# Patient Record
Sex: MALE | Race: BLACK OR AFRICAN AMERICAN | NOT HISPANIC OR LATINO | Employment: FULL TIME | URBAN - METROPOLITAN AREA
[De-identification: names, ages, dates, MRNs, and addresses within clinical notes are randomized per-mention and may not be internally consistent; named-entity substitution may affect disease eponyms.]

---

## 2020-02-20 ENCOUNTER — HOSPITAL ENCOUNTER (OUTPATIENT)
Facility: HOSPITAL | Age: 25
Discharge: HOME | End: 2020-02-21
Attending: EMERGENCY MEDICINE | Admitting: INTERNAL MEDICINE
Payer: COMMERCIAL

## 2020-02-20 DIAGNOSIS — S62.304A CLOSED NONDISPLACED FRACTURE OF FOURTH METACARPAL BONE OF RIGHT HAND, UNSPECIFIED PORTION OF METACARPAL, INITIAL ENCOUNTER: ICD-10-CM

## 2020-02-20 DIAGNOSIS — S62.90XB: ICD-10-CM

## 2020-02-20 DIAGNOSIS — M79.641 RIGHT HAND PAIN: Primary | ICD-10-CM

## 2020-02-20 LAB
ANION GAP SERPL CALC-SCNC: 10 MMOL/L (ref 8–16)
BASOPHILS # BLD AUTO: 0.04 K/UL (ref 0–0.2)
BASOPHILS NFR BLD: 0.5 % (ref 0–1.9)
BUN SERPL-MCNC: 13 MG/DL (ref 6–20)
CALCIUM SERPL-MCNC: 9.9 MG/DL (ref 8.7–10.5)
CHLORIDE SERPL-SCNC: 106 MMOL/L (ref 95–110)
CO2 SERPL-SCNC: 24 MMOL/L (ref 23–29)
CREAT SERPL-MCNC: 1.2 MG/DL (ref 0.5–1.4)
DIFFERENTIAL METHOD: ABNORMAL
EOSINOPHIL # BLD AUTO: 0.1 K/UL (ref 0–0.5)
EOSINOPHIL NFR BLD: 1.3 % (ref 0–8)
ERYTHROCYTE [DISTWIDTH] IN BLOOD BY AUTOMATED COUNT: 13.1 % (ref 11.5–14.5)
EST. GFR  (AFRICAN AMERICAN): >60 ML/MIN/1.73 M^2
EST. GFR  (NON AFRICAN AMERICAN): >60 ML/MIN/1.73 M^2
GLUCOSE SERPL-MCNC: 88 MG/DL (ref 70–110)
HCT VFR BLD AUTO: 43.1 % (ref 40–54)
HGB BLD-MCNC: 13.6 G/DL (ref 14–18)
IMM GRANULOCYTES # BLD AUTO: 0.02 K/UL (ref 0–0.04)
IMM GRANULOCYTES NFR BLD AUTO: 0.3 % (ref 0–0.5)
LYMPHOCYTES # BLD AUTO: 1.3 K/UL (ref 1–4.8)
LYMPHOCYTES NFR BLD: 16.2 % (ref 18–48)
MCH RBC QN AUTO: 26.8 PG (ref 27–31)
MCHC RBC AUTO-ENTMCNC: 31.6 G/DL (ref 32–36)
MCV RBC AUTO: 85 FL (ref 82–98)
MONOCYTES # BLD AUTO: 0.5 K/UL (ref 0.3–1)
MONOCYTES NFR BLD: 5.8 % (ref 4–15)
NEUTROPHILS # BLD AUTO: 5.9 K/UL (ref 1.8–7.7)
NEUTROPHILS NFR BLD: 75.9 % (ref 38–73)
NRBC BLD-RTO: 0 /100 WBC
PLATELET # BLD AUTO: 195 K/UL (ref 150–350)
PMV BLD AUTO: 10.3 FL (ref 9.2–12.9)
POTASSIUM SERPL-SCNC: 4.2 MMOL/L (ref 3.5–5.1)
RBC # BLD AUTO: 5.08 M/UL (ref 4.6–6.2)
SODIUM SERPL-SCNC: 140 MMOL/L (ref 136–145)
WBC # BLD AUTO: 7.79 K/UL (ref 3.9–12.7)

## 2020-02-20 PROCEDURE — 96365 THER/PROPH/DIAG IV INF INIT: CPT | Mod: 59

## 2020-02-20 PROCEDURE — 63600175 PHARM REV CODE 636 W HCPCS: Performed by: NURSE PRACTITIONER

## 2020-02-20 PROCEDURE — G0378 HOSPITAL OBSERVATION PER HR: HCPCS

## 2020-02-20 PROCEDURE — 90715 TDAP VACCINE 7 YRS/> IM: CPT | Performed by: NURSE PRACTITIONER

## 2020-02-20 PROCEDURE — 29125 APPL SHORT ARM SPLINT STATIC: CPT | Mod: RT

## 2020-02-20 PROCEDURE — 80048 BASIC METABOLIC PNL TOTAL CA: CPT

## 2020-02-20 PROCEDURE — 99285 EMERGENCY DEPT VISIT HI MDM: CPT | Mod: 25

## 2020-02-20 PROCEDURE — 85025 COMPLETE CBC W/AUTO DIFF WBC: CPT

## 2020-02-20 PROCEDURE — 36415 COLL VENOUS BLD VENIPUNCTURE: CPT

## 2020-02-20 PROCEDURE — 25000003 PHARM REV CODE 250: Performed by: NURSE PRACTITIONER

## 2020-02-20 PROCEDURE — 96375 TX/PRO/DX INJ NEW DRUG ADDON: CPT | Mod: 59

## 2020-02-20 PROCEDURE — 90471 IMMUNIZATION ADMIN: CPT | Performed by: NURSE PRACTITIONER

## 2020-02-20 RX ORDER — OXYCODONE AND ACETAMINOPHEN 5; 325 MG/1; MG/1
1 TABLET ORAL EVERY 4 HOURS PRN
Status: DISCONTINUED | OUTPATIENT
Start: 2020-02-20 | End: 2020-02-21 | Stop reason: HOSPADM

## 2020-02-20 RX ORDER — ONDANSETRON 4 MG/1
4 TABLET, ORALLY DISINTEGRATING ORAL
Status: COMPLETED | OUTPATIENT
Start: 2020-02-20 | End: 2020-02-20

## 2020-02-20 RX ORDER — MORPHINE SULFATE 4 MG/ML
4 INJECTION, SOLUTION INTRAMUSCULAR; INTRAVENOUS
Status: COMPLETED | OUTPATIENT
Start: 2020-02-20 | End: 2020-02-20

## 2020-02-20 RX ORDER — OXYCODONE AND ACETAMINOPHEN 10; 325 MG/1; MG/1
1 TABLET ORAL EVERY 4 HOURS PRN
Status: DISCONTINUED | OUTPATIENT
Start: 2020-02-20 | End: 2020-02-21 | Stop reason: HOSPADM

## 2020-02-20 RX ORDER — ACETAMINOPHEN 325 MG/1
650 TABLET ORAL EVERY 6 HOURS PRN
Status: DISCONTINUED | OUTPATIENT
Start: 2020-02-20 | End: 2020-02-21 | Stop reason: HOSPADM

## 2020-02-20 RX ORDER — MAG HYDROX/ALUMINUM HYD/SIMETH 200-200-20
30 SUSPENSION, ORAL (FINAL DOSE FORM) ORAL EVERY 6 HOURS PRN
Status: DISCONTINUED | OUTPATIENT
Start: 2020-02-20 | End: 2020-02-21 | Stop reason: HOSPADM

## 2020-02-20 RX ORDER — CEFAZOLIN SODIUM 1 G/50ML
1 SOLUTION INTRAVENOUS
Status: COMPLETED | OUTPATIENT
Start: 2020-02-20 | End: 2020-02-20

## 2020-02-20 RX ORDER — ONDANSETRON 2 MG/ML
4 INJECTION INTRAMUSCULAR; INTRAVENOUS EVERY 8 HOURS PRN
Status: DISCONTINUED | OUTPATIENT
Start: 2020-02-20 | End: 2020-02-21 | Stop reason: HOSPADM

## 2020-02-20 RX ADMIN — ONDANSETRON 4 MG: 4 TABLET, ORALLY DISINTEGRATING ORAL at 11:02

## 2020-02-20 RX ADMIN — CLOSTRIDIUM TETANI TOXOID ANTIGEN (FORMALDEHYDE INACTIVATED), CORYNEBACTERIUM DIPHTHERIAE TOXOID ANTIGEN (FORMALDEHYDE INACTIVATED), BORDETELLA PERTUSSIS TOXOID ANTIGEN (GLUTARALDEHYDE INACTIVATED), BORDETELLA PERTUSSIS FILAMENTOUS HEMAGGLUTININ ANTIGEN (FORMALDEHYDE INACTIVATED), BORDETELLA PERTUSSIS PERTACTIN ANTIGEN, AND BORDETELLA PERTUSSIS FIMBRIAE 2/3 ANTIGEN 0.5 ML: 5; 2; 2.5; 5; 3; 5 INJECTION, SUSPENSION INTRAMUSCULAR at 09:02

## 2020-02-20 RX ADMIN — CEFAZOLIN SODIUM 1 G: 1 SOLUTION INTRAVENOUS at 11:02

## 2020-02-20 RX ADMIN — MORPHINE SULFATE 4 MG: 4 INJECTION, SOLUTION INTRAMUSCULAR; INTRAVENOUS at 11:02

## 2020-02-21 ENCOUNTER — ANESTHESIA (OUTPATIENT)
Dept: SURGERY | Facility: HOSPITAL | Age: 25
End: 2020-02-21
Payer: COMMERCIAL

## 2020-02-21 ENCOUNTER — ANESTHESIA EVENT (OUTPATIENT)
Dept: SURGERY | Facility: HOSPITAL | Age: 25
End: 2020-02-21
Payer: COMMERCIAL

## 2020-02-21 VITALS
DIASTOLIC BLOOD PRESSURE: 57 MMHG | TEMPERATURE: 98 F | SYSTOLIC BLOOD PRESSURE: 128 MMHG | OXYGEN SATURATION: 98 % | BODY MASS INDEX: 19.92 KG/M2 | WEIGHT: 147.06 LBS | HEART RATE: 56 BPM | HEIGHT: 72 IN | RESPIRATION RATE: 16 BRPM

## 2020-02-21 PROBLEM — M79.641 RIGHT HAND PAIN: Status: ACTIVE | Noted: 2020-02-21

## 2020-02-21 PROBLEM — S62.304A CLOSED NONDISPLACED FRACTURE OF FOURTH METACARPAL BONE OF RIGHT HAND: Status: ACTIVE | Noted: 2020-02-21

## 2020-02-21 PROCEDURE — G0378 HOSPITAL OBSERVATION PER HR: HCPCS

## 2020-02-21 PROCEDURE — 36000709 HC OR TIME LEV III EA ADD 15 MIN: Performed by: ORTHOPAEDIC SURGERY

## 2020-02-21 PROCEDURE — 36000708 HC OR TIME LEV III 1ST 15 MIN: Performed by: ORTHOPAEDIC SURGERY

## 2020-02-21 PROCEDURE — 63600175 PHARM REV CODE 636 W HCPCS: Performed by: NURSE ANESTHETIST, CERTIFIED REGISTERED

## 2020-02-21 PROCEDURE — C1713 ANCHOR/SCREW BN/BN,TIS/BN: HCPCS | Performed by: ORTHOPAEDIC SURGERY

## 2020-02-21 PROCEDURE — 37000008 HC ANESTHESIA 1ST 15 MINUTES: Performed by: ORTHOPAEDIC SURGERY

## 2020-02-21 PROCEDURE — 37000009 HC ANESTHESIA EA ADD 15 MINS: Performed by: ORTHOPAEDIC SURGERY

## 2020-02-21 PROCEDURE — 71000033 HC RECOVERY, INTIAL HOUR: Performed by: ORTHOPAEDIC SURGERY

## 2020-02-21 PROCEDURE — 25000003 PHARM REV CODE 250: Performed by: INTERNAL MEDICINE

## 2020-02-21 PROCEDURE — 25000003 PHARM REV CODE 250: Performed by: NURSE ANESTHETIST, CERTIFIED REGISTERED

## 2020-02-21 PROCEDURE — 27201423 OPTIME MED/SURG SUP & DEVICES STERILE SUPPLY: Performed by: ORTHOPAEDIC SURGERY

## 2020-02-21 PROCEDURE — 64415 NJX AA&/STRD BRCH PLXS IMG: CPT | Performed by: NURSE ANESTHETIST, CERTIFIED REGISTERED

## 2020-02-21 DEVICE — IMPLANTABLE DEVICE: Type: IMPLANTABLE DEVICE | Site: HAND | Status: FUNCTIONAL

## 2020-02-21 RX ORDER — KETOROLAC TROMETHAMINE 30 MG/ML
INJECTION, SOLUTION INTRAMUSCULAR; INTRAVENOUS
Status: DISCONTINUED | OUTPATIENT
Start: 2020-02-21 | End: 2020-02-21

## 2020-02-21 RX ORDER — ONDANSETRON 2 MG/ML
4 INJECTION INTRAMUSCULAR; INTRAVENOUS DAILY PRN
Status: DISCONTINUED | OUTPATIENT
Start: 2020-02-21 | End: 2020-02-21 | Stop reason: HOSPADM

## 2020-02-21 RX ORDER — SODIUM CHLORIDE, SODIUM LACTATE, POTASSIUM CHLORIDE, CALCIUM CHLORIDE 600; 310; 30; 20 MG/100ML; MG/100ML; MG/100ML; MG/100ML
INJECTION, SOLUTION INTRAVENOUS CONTINUOUS PRN
Status: DISCONTINUED | OUTPATIENT
Start: 2020-02-21 | End: 2020-02-21

## 2020-02-21 RX ORDER — SODIUM CHLORIDE 0.9 % (FLUSH) 0.9 %
10 SYRINGE (ML) INJECTION
Status: DISCONTINUED | OUTPATIENT
Start: 2020-02-21 | End: 2020-02-21 | Stop reason: HOSPADM

## 2020-02-21 RX ORDER — ROPIVACAINE HYDROCHLORIDE 5 MG/ML
INJECTION, SOLUTION EPIDURAL; INFILTRATION; PERINEURAL
Status: COMPLETED | OUTPATIENT
Start: 2020-02-21 | End: 2020-02-21

## 2020-02-21 RX ORDER — MIDAZOLAM HYDROCHLORIDE 1 MG/ML
INJECTION, SOLUTION INTRAMUSCULAR; INTRAVENOUS
Status: DISCONTINUED | OUTPATIENT
Start: 2020-02-21 | End: 2020-02-21

## 2020-02-21 RX ORDER — SUCCINYLCHOLINE CHLORIDE 20 MG/ML
INJECTION INTRAMUSCULAR; INTRAVENOUS
Status: DISCONTINUED | OUTPATIENT
Start: 2020-02-21 | End: 2020-02-21

## 2020-02-21 RX ORDER — CEFAZOLIN SODIUM 1 G/3ML
INJECTION, POWDER, FOR SOLUTION INTRAMUSCULAR; INTRAVENOUS
Status: DISCONTINUED | OUTPATIENT
Start: 2020-02-21 | End: 2020-02-21

## 2020-02-21 RX ORDER — LIDOCAINE HYDROCHLORIDE 20 MG/ML
INJECTION, SOLUTION EPIDURAL; INFILTRATION; INTRACAUDAL; PERINEURAL
Status: DISCONTINUED | OUTPATIENT
Start: 2020-02-21 | End: 2020-02-21

## 2020-02-21 RX ORDER — FENTANYL CITRATE 50 UG/ML
25 INJECTION, SOLUTION INTRAMUSCULAR; INTRAVENOUS EVERY 5 MIN PRN
Status: DISCONTINUED | OUTPATIENT
Start: 2020-02-21 | End: 2020-02-21 | Stop reason: HOSPADM

## 2020-02-21 RX ORDER — CEFAZOLIN SODIUM 2 G/50ML
2 SOLUTION INTRAVENOUS
Status: DISCONTINUED | OUTPATIENT
Start: 2020-02-21 | End: 2020-02-21 | Stop reason: HOSPADM

## 2020-02-21 RX ORDER — HYDROCODONE BITARTRATE AND ACETAMINOPHEN 7.5; 325 MG/15ML; MG/15ML
15 SOLUTION ORAL EVERY 4 HOURS PRN
Qty: 630 ML | Refills: 0 | Status: SHIPPED | OUTPATIENT
Start: 2020-02-21 | End: 2020-12-26 | Stop reason: CLARIF

## 2020-02-21 RX ORDER — PROPOFOL 10 MG/ML
VIAL (ML) INTRAVENOUS
Status: DISCONTINUED | OUTPATIENT
Start: 2020-02-21 | End: 2020-02-21

## 2020-02-21 RX ORDER — ONDANSETRON 2 MG/ML
INJECTION INTRAMUSCULAR; INTRAVENOUS
Status: DISCONTINUED | OUTPATIENT
Start: 2020-02-21 | End: 2020-02-21

## 2020-02-21 RX ORDER — GLYCOPYRROLATE 0.2 MG/ML
INJECTION INTRAMUSCULAR; INTRAVENOUS
Status: DISCONTINUED | OUTPATIENT
Start: 2020-02-21 | End: 2020-02-21

## 2020-02-21 RX ORDER — CHLORHEXIDINE GLUCONATE ORAL RINSE 1.2 MG/ML
10 SOLUTION DENTAL
Status: DISCONTINUED | OUTPATIENT
Start: 2020-02-21 | End: 2020-02-21 | Stop reason: HOSPADM

## 2020-02-21 RX ORDER — BUPIVACAINE HYDROCHLORIDE 2.5 MG/ML
INJECTION, SOLUTION EPIDURAL; INFILTRATION; INTRACAUDAL
Status: DISCONTINUED | OUTPATIENT
Start: 2020-02-21 | End: 2020-02-21 | Stop reason: HOSPADM

## 2020-02-21 RX ORDER — DEXAMETHASONE SODIUM PHOSPHATE 4 MG/ML
INJECTION, SOLUTION INTRA-ARTICULAR; INTRALESIONAL; INTRAMUSCULAR; INTRAVENOUS; SOFT TISSUE
Status: DISCONTINUED | OUTPATIENT
Start: 2020-02-21 | End: 2020-02-21

## 2020-02-21 RX ADMIN — KETOROLAC TROMETHAMINE 30 MG: 30 INJECTION, SOLUTION INTRAMUSCULAR; INTRAVENOUS at 10:02

## 2020-02-21 RX ADMIN — PROPOFOL 50 MG: 10 INJECTION, EMULSION INTRAVENOUS at 11:02

## 2020-02-21 RX ADMIN — PROPOFOL 10 MG: 10 INJECTION, EMULSION INTRAVENOUS at 10:02

## 2020-02-21 RX ADMIN — ONDANSETRON 4 MG: 2 INJECTION, SOLUTION INTRAMUSCULAR; INTRAVENOUS at 08:02

## 2020-02-21 RX ADMIN — SUCCINYLCHOLINE CHLORIDE 10 MG: 20 INJECTION, SOLUTION INTRAMUSCULAR; INTRAVENOUS at 11:02

## 2020-02-21 RX ADMIN — SODIUM CHLORIDE, SODIUM LACTATE, POTASSIUM CHLORIDE, AND CALCIUM CHLORIDE: 600; 310; 30; 20 INJECTION, SOLUTION INTRAVENOUS at 08:02

## 2020-02-21 RX ADMIN — PROPOFOL 50 MG: 10 INJECTION, EMULSION INTRAVENOUS at 08:02

## 2020-02-21 RX ADMIN — OXYCODONE HYDROCHLORIDE AND ACETAMINOPHEN 1 TABLET: 10; 325 TABLET ORAL at 03:02

## 2020-02-21 RX ADMIN — ROPIVACAINE HYDROCHLORIDE 20 ML: 5 INJECTION, SOLUTION EPIDURAL; INFILTRATION; PERINEURAL at 08:02

## 2020-02-21 RX ADMIN — LIDOCAINE HYDROCHLORIDE 80 MG: 20 INJECTION, SOLUTION EPIDURAL; INFILTRATION; INTRACAUDAL; PERINEURAL at 08:02

## 2020-02-21 RX ADMIN — PROPOFOL 20 MG: 10 INJECTION, EMULSION INTRAVENOUS at 11:02

## 2020-02-21 RX ADMIN — MIDAZOLAM 2 MG: 1 INJECTION INTRAMUSCULAR; INTRAVENOUS at 08:02

## 2020-02-21 RX ADMIN — PROPOFOL 100 MG: 10 INJECTION, EMULSION INTRAVENOUS at 08:02

## 2020-02-21 RX ADMIN — DEXAMETHASONE SODIUM PHOSPHATE 4 MG: 4 INJECTION, SOLUTION INTRA-ARTICULAR; INTRALESIONAL; INTRAMUSCULAR; INTRAVENOUS; SOFT TISSUE at 08:02

## 2020-02-21 RX ADMIN — ROBINUL 0.2 MG: 0.2 INJECTION INTRAMUSCULAR; INTRAVENOUS at 08:02

## 2020-02-21 RX ADMIN — PROPOFOL 20 MG: 10 INJECTION, EMULSION INTRAVENOUS at 10:02

## 2020-02-21 RX ADMIN — CEFAZOLIN 2 G: 1 INJECTION, POWDER, FOR SOLUTION INTRAMUSCULAR; INTRAVENOUS at 08:02

## 2020-02-21 NOTE — ED NOTES
Requested that pts meds be changed to liquid, as pt states he is unable to swallow pills. Hospital medicine states, continue to crush pills.

## 2020-02-21 NOTE — ANESTHESIA PREPROCEDURE EVALUATION
02/21/2020  Oswaldo Prakash Jr. is a 24 y.o., male.    Anesthesia Evaluation    I have reviewed the Patient Summary Reports.    I have reviewed the Nursing Notes.   I have reviewed the Medications.     Review of Systems  Anesthesia Hx:  No problems with previous Anesthesia Denies Hx of Anesthetic complications  Neg history of prior surgery. Denies Family Hx of Anesthesia complications.   Denies Personal Hx of Anesthesia complications.   Social:  Non-Smoker, No Alcohol Use    Cardiovascular:  Cardiovascular Normal  ECG has been reviewed.    Pulmonary:  Pulmonary Normal    Renal/:  Renal/ Normal     Hepatic/GI:  Hepatic/GI Normal    Neurological:  Neurology Normal    Endocrine:  Endocrine Normal    Psych:  Psychiatric Normal         Patient Active Problem List   Diagnosis    Open hand fracture    Closed nondisplaced fracture of fourth and fifth metacarpal bone of right hand         Physical Exam  General:  Well nourished    Airway/Jaw/Neck:  Airway Findings: Mouth Opening: Normal Tongue: Normal  General Airway Assessment: Adult  Mallampati: II  TM Distance: 4 - 6 cm  Jaw/Neck Findings:  Neck ROM: Normal ROM      Dental:  Dental Findings: In tact, Prominent Incisors   Chest/Lungs:  Chest/Lungs Findings: Clear to auscultation, Normal Respiratory Rate     Heart/Vascular:  Heart Findings: Rate: Normal  Rhythm: Regular Rhythm  Sounds: Normal        Mental Status:  Mental Status Findings:  Cooperative, Alert and Oriented       Lab Results   Component Value Date    WBC 7.79 02/20/2020    HGB 13.6 (L) 02/20/2020    HCT 43.1 02/20/2020    MCV 85 02/20/2020     02/20/2020         Chemistry        Component Value Date/Time     02/20/2020 2315    K 4.2 02/20/2020 2315     02/20/2020 2315    CO2 24 02/20/2020 2315    BUN 13 02/20/2020 2315    CREATININE 1.2 02/20/2020 2315    GLU 88 02/20/2020  2315        Component Value Date/Time    CALCIUM 9.9 02/20/2020 2315    ESTGFRAFRICA >60 02/20/2020 2315    EGFRNONAA >60 02/20/2020 2315            Anesthesia Plan  Type of Anesthesia, risks & benefits discussed:  Anesthesia Type:  general, regional  Patient's Preference:   Intra-op Monitoring Plan: standard ASA monitors  Intra-op Monitoring Plan Comments:   Post Op Pain Control Plan: per primary service following discharge from PACU and peripheral nerve block  Post Op Pain Control Plan Comments:   Induction:   IV  Beta Blocker:  Patient is not currently on a Beta-Blocker (No further documentation required).       Informed Consent: Patient understands risks and agrees with Anesthesia plan.  Questions answered. Anesthesia consent signed with patient.  ASA Score: 2     Day of Surgery Review of History & Physical: I have interviewed and examined the patient. I have reviewed the patient's H&P dated:  There are no significant changes.  H&P update referred to the surgeon.         Ready For Surgery From Anesthesia Perspective.

## 2020-02-21 NOTE — ANESTHESIA PROCEDURE NOTES
Peripheral Block    Patient location during procedure: pre-op   Block not for primary anesthetic.  Reason for block: at surgeon's request and post-op pain management   Post-op Pain Location: right hand  Start time: 2/21/2020 10:15 AM  Timeout: 2/21/2020 8:14 AM   End time: 2/21/2020 8:23 AM    Staffing  Authorizing Provider: Zachariah Hogan Jr., CRNA  Performing Provider: Zachariah Hogan Jr., CRNA    Preanesthetic Checklist  Completed: patient identified, site marked, surgical consent, pre-op evaluation, timeout performed, IV checked, risks and benefits discussed and monitors and equipment checked  Peripheral Block  Patient position: supine  Prep: ChloraPrep  Patient monitoring: heart rate, cardiac monitor, continuous pulse ox and frequent blood pressure checks  Block type: supraclavicular  Laterality: right  Injection technique: single shot  Needle  Needle type: Stimuplex   Needle gauge: 22 G  Needle length: 4 in  Needle localization: anatomical landmarks     Assessment  Injection assessment: negative aspiration and negative parasthesia  Heart rate change: no  Slow fractionated injection: yes  Additional Notes  NO APPARENT ANESTHESIA COMPLICATIONS

## 2020-02-21 NOTE — ANESTHESIA PROCEDURE NOTES
Intubation  Performed by: Kimo Christina CRNA  Authorized by: Montez Dunham MD     Intubation:     Induction:  Intravenous    Intubated:  Postinduction    Mask Ventilation:  Easy mask    Attempts:  1    Attempted By:  CRNA    Difficult Airway Encountered?: No      Complications:  None    Airway Device:  Supraglottic airway/LMA    Airway Device Size:  5.0    Placement Verified By:  Capnometry    Complicating Factors:  None    Findings Post-Intubation:  Atraumatic/condition of teeth unchanged and BS equal bilateral

## 2020-02-21 NOTE — OP NOTE
[facility] -   General Surgery  Operative Note    SUMMARY     Date of Procedure: 2/21/2020     Procedure: Procedure(s) (LRB):  ORIF, HAND (Right)       Surgeon(s) and Role:     * Rafiq Gracia MD - Primary    Assisting Surgeon: None    Pre-Operative Diagnosis: Right hand pain [M79.641]    Post-Operative Diagnosis: Post-Op Diagnosis Codes:     * Right hand pain [M79.641]    Anesthesia: General    Technical Procedures Used: Open reduction and internal fixation 4th, 5th metacarpal with irrigation and debridment    Description of the Findings of the Procedure:  The patient initially identified in preoperative holding area. At that time we reviewed indications for surgery, advantages surgery, alternative treatments, likely postsurgical course.  Patient indicated he understood our discussion wanted proceed.  Consent obtained surgical site marked. An axillary block was administered.  Patient was then transferred the operating theater.  Anesthesia administered a LMA anesthetic. Nonsterile arm tourniquet was placed.  The extremity was prepped and draped in standard aseptic manner.  Antibiotics given.  Time-out performed. A linear incision in the dorsal 4th web space was made.  Skin incised dissection down the extensor tendons.  These are identified and protected.  Dissection down to the 4th metacarpal.  Fracture fragments were identified.  Wound bed irrigated. A dorsal 4 hole plate was placed.  Provisional reduction was maintained with point-to-point reduction clamps.  Two fully-threaded cortical screws were placed proximal and 2 fully-threaded cortical screws distal to the fracture line.  All achieved appropriate compression.  C-arm views were obtained which showed adequacy of reduction. The more comminuted 5th metacarpal fracture was then addresses.  Wound bed was irrigated. Any debris was excised. However there was minimal debris. The large butterfly fragment was provisionally reduced and secured with a  2.4 mm lag screw. A dorsal plate was then placed. A total of 4 screws were placed proximally and 3 distally.  All achieved adequate compression.  Final C-arm views were obtained which showed adequacy of reduction and screw lengths.  Wound bed irrigated. Layered closure commenced.  For the deep tissue 2 Vicryl interrupted buried fashion.  Subcutaneous tissue 2 Monocryl interrupted buried fashion.  Three 0 nylon running suture fashion for the skin.  Sterile dressings were placed.  An ulnar gutter splint was placed. Patient was extubated returned to recovery without event.  Postop plan:  Patient is clear for discharge from orthopedic perspective.  He will be nonweightbearing in the right upper extremity for 2 weeks.  At that time he will follow up in the office.  He will transition to a wrist control splint.  He will start gentle range-of-motion exercises.  And assuming x-rays show appropriate healing and no hardware failure he will be able to hold no more than 5 lb.    Significant Surgical Tasks Conducted by the Assistant(s), if Applicable: Zachariah GANDARA    Complications: No    Estimated Blood Loss (EBL): 5 mL           Implants:   Implant Name Type Inv. Item Serial No.  Lot No. LRB No. Used   SCREW CORTEX 2MM 12MML - [id number]  SCREW CORTEX 2MM 12MML  SYNTHES  Right 1   SCREW CORTEX 2.4M 10M - [id number]  SCREW CORTEX 2.4M 10M  SYNTHES  Right 1   SCREW CORTEX 2.4M 12M - [id number]  SCREW CORTEX 2.4M 12M  SYNTHES  Right 5   SCREW CRTX S-T-R 2.4D13 - [id number]  SCREW CRTX S-T-R 2.4D13  SYNTHES  Right 1   SCREW CORTEX 2.4M 14M - [id number]  SCREW CORTEX 2.4M 14M  SYNTHES  Right 2   SCREW CORTEX 2.5N08GTS - [id number]  SCREW CORTEX 2.1T31RZR  SYNTHES  Right 1   PLATE T TITANIUM 2.4MM 8H 54MM - [id number]  PLATE T TITANIUM 2.4MM 8H 54MM  SYNTHES  Right 1   PLATE LC-DCP 2.4 4HL 31 - [id number]  PLATE LC-DCP 2.4 4HL 31  SYNTHES  Right 1       Specimens:   Specimen (12h ago, onward)    None                   Condition: Good    Disposition: PACU - hemodynamically stable.    Attestation: I was present and scrubbed for the entire procedure.

## 2020-02-21 NOTE — HOSPITAL COURSE
Patient was kept on OBS for closed nondisplaced fracture of fourth and fifth metacarpal bone of R hand under the care of Hospital Medicine. Dr. Garcia performed ORIF and pt tolerated well. He will dc home: NWB to R arm X 2 weeks, at which time he will f/u with Ortho. He reports he is unable to swallow pills, liquid hydrocodone called to pharmacy for pain control. All was discussed with patient and he verbalized understanding. Patient was seen and examined today and deemed stable for discharge home.

## 2020-02-21 NOTE — ANESTHESIA POSTPROCEDURE EVALUATION
Anesthesia Post Evaluation    Patient: Oswaldo Prakash Jr.    Procedure(s) Performed: Procedure(s) (LRB):  ORIF, HAND (Right)    Final Anesthesia Type: general    Patient location during evaluation: PACU  Patient participation: Yes- Able to Participate  Level of consciousness: awake and alert and oriented  Post-procedure vital signs: reviewed and stable  Pain management: adequate  Airway patency: patent  HEMANT mitigation strategies: Use of major conduction anesthesia (spinal/epidural) or peripheral nerve block  PONV status at discharge: No PONV  Anesthetic complications: no      Cardiovascular status: blood pressure returned to baseline and hemodynamically stable  Respiratory status: unassisted and room air  Hydration status: euvolemic  Follow-up not needed.          Vitals Value Taken Time   /57 2/21/2020 12:00 PM   Temp 36.8 °C (98.2 °F) 2/21/2020 11:28 AM   Pulse 58 2/21/2020 12:03 PM   Resp 25 2/21/2020 12:02 PM   SpO2 100 % 2/21/2020 12:03 PM   Vitals shown include unvalidated device data.      Event Time     Out of Recovery 02/21/2020 12:05:32          Pain/Jenelle Score: Pain Rating Prior to Med Admin: 8 (2/21/2020  3:40 AM)  Jenelle Score: 9 (2/21/2020 12:00 PM)

## 2020-02-21 NOTE — SUBJECTIVE & OBJECTIVE
Past Medical History:   Diagnosis Date    Allergy        History reviewed. No pertinent surgical history.    Review of patient's allergies indicates:   Allergen Reactions    Guaifenesin Hives and Shortness Of Breath    Guaifenesin-sodium citrate        No current facility-administered medications on file prior to encounter.      Current Outpatient Medications on File Prior to Encounter   Medication Sig    azelastine-fluticasone 137-50 mcg/spray [place] 1 each by Nasal route 2 (two) times daily.     Family History     Problem Relation (Age of Onset)    No Known Problems Mother, Father        Tobacco Use    Smoking status: Never Smoker    Smokeless tobacco: Never Used   Substance and Sexual Activity    Alcohol use: No    Drug use: Yes     Types: Marijuana    Sexual activity: Never     Review of Systems   Constitutional: Negative.  Negative for appetite change, fatigue and fever.   HENT: Negative.  Negative for congestion, nosebleeds and sore throat.    Eyes: Negative.  Negative for visual disturbance.   Respiratory: Negative.  Negative for cough, shortness of breath and wheezing.    Cardiovascular: Negative.  Negative for chest pain, palpitations and leg swelling.   Gastrointestinal: Negative.  Negative for abdominal pain, constipation, diarrhea, nausea and vomiting.   Endocrine: Negative.  Negative for polyuria.   Genitourinary: Negative.  Negative for dysuria, flank pain, frequency and urgency.   Musculoskeletal: Negative for arthralgias, back pain and joint swelling.        Right foot and 5th finger pain   Skin: Negative.  Negative for color change, pallor and rash.   Allergic/Immunologic: Negative.  Negative for immunocompromised state.   Neurological: Negative.  Negative for dizziness, syncope, weakness, light-headedness, numbness and headaches.   Hematological: Negative.    Psychiatric/Behavioral: Negative.  Negative for confusion and hallucinations. The patient is not nervous/anxious.    All other systems  reviewed and are negative.    Objective:     Vital Signs (Most Recent):  Temp: 98.7 °F (37.1 °C) (02/21/20 0251)  Pulse: (!) 58 (02/21/20 0438)  Resp: 16 (02/21/20 0438)  BP: (!) 103/57 (02/21/20 0438)  SpO2: 98 % (02/21/20 0438) Vital Signs (24h Range):  Temp:  [98.7 °F (37.1 °C)-99.5 °F (37.5 °C)] 98.7 °F (37.1 °C)  Pulse:  [58-67] 58  Resp:  [16-18] 16  SpO2:  [98 %-100 %] 98 %  BP: (103-124)/(57-61) 103/57     Weight: 66.7 kg (147 lb 0.8 oz)  Body mass index is 19.94 kg/m².    Physical Exam   Constitutional: He is oriented to person, place, and time. He appears well-developed and well-nourished. No distress.   HENT:   Head: Normocephalic and atraumatic.   Eyes: Pupils are equal, round, and reactive to light. Conjunctivae and EOM are normal. No scleral icterus.   Neck: Normal range of motion. Neck supple. No thyromegaly present.   Cardiovascular: Normal rate, regular rhythm and normal heart sounds.   No murmur heard.  Pulmonary/Chest: Effort normal and breath sounds normal. No respiratory distress. He has no wheezes. He exhibits no tenderness.   Abdominal: Soft. Bowel sounds are normal. There is no tenderness.   Musculoskeletal: He exhibits tenderness (Right hand in a splint.). He exhibits no edema.   Lymphadenopathy:     He has no cervical adenopathy.   Neurological: He is alert and oriented to person, place, and time. No cranial nerve deficit. He exhibits normal muscle tone. Coordination normal.   Skin: Skin is warm and dry. He is not diaphoretic. No erythema.   Psychiatric: He has a normal mood and affect. His behavior is normal. Thought content normal.   Nursing note and vitals reviewed.        CRANIAL NERVES     CN III, IV, VI   Pupils are equal, round, and reactive to light.  Extraocular motions are normal.        Significant Labs:   BMP:   Recent Labs   Lab 02/20/20  2315   GLU 88      K 4.2      CO2 24   BUN 13   CREATININE 1.2   CALCIUM 9.9     CBC:   Recent Labs   Lab 02/20/20  2315   WBC  7.79   HGB 13.6*   HCT 43.1        All pertinent labs within the past 24 hours have been reviewed.    Significant Imaging: I have reviewed and interpreted all pertinent imaging results/findings within the past 24 hours.     Imaging Results          X-Ray Hand 3 view Right (Final result)  Result time 02/20/20 21:51:29    Final result by Evans Fulton MD (02/20/20 21:51:29)                 Impression:      1.  Comminuted and angulated fractures of the 4th and 5th metacarpal shafts.    2.  Air is seen within the soft tissues overlying the 5th metacarpal fracture site, concerning for open fracture.  Negative for radiopaque foreign bodies.    3.  Negative for acute process otherwise.      Electronically signed by: Evans Fulton MD  Date:    02/20/2020  Time:    21:51             Narrative:    EXAMINATION:  XR HAND COMPLETE 3 VIEW RIGHT    CLINICAL HISTORY:  hand injury;    TECHNIQUE:  PA, lateral, and oblique views of the right hand were performed.    COMPARISON:  None    FINDINGS:  There are comminuted and angulated fractures of the 4th and 5th metacarpal shafts.  Negative for joint surface involvement.    No other fractures are seen.  Negative for radiopaque foreign bodies.  There is a small amount of air within the soft tissues overlying the 5th metacarpal fracture site, concerning for an open fracture.    Carpal bones are well aligned.                                I have independently reviewed all pertinent labs within the past 24 hours.    I have independently reviewed, visualized and interpreted all pertinent imaging results within the past 24 hours and discussed the findings with the ED physician.

## 2020-02-21 NOTE — H&P
[facility] Hospital Medicine  History & Physical    Patient Name: Oswaldo Prakash Jr.  MRN: [id number]  Admission Date: 2/20/2020  Attending Physician: Juan M Mesa MD  Primary Care Provider: Primary Doctor No         Patient information was obtained from patient, past medical records and ER records.     Subjective:     Principal Problem:Closed nondisplaced fracture of fourth metacarpal bone of right hand    Chief Complaint:   Chief Complaint   Patient presents with    Hand Pain     pt reports bowling b all smashed his hand. + swelling and deformity noted to right hand. pt is able to move digit         HPI: Mr. Prakash is a young 24-year-old  male with no medical problems, was at a bowling alley, was trying to fetch a ball, and another ball came right through the return machine and his right hand got stuck between two heavy balls.  Patient immediately started complaining of right 4th and 5th finger pain, hence presented to the ED.  X-ray revealed comminuted and angulated fractures of the 4th and 5th metacarpal shafts. Air is seen within the soft tissues overlying the 5th metacarpal fracture site, concerning for open fracture.  Negative for radiopaque foreign bodies.  Patient was placed in a splint.  Dr. Garcia with Orthopedics was consulted recommended admission for further evaluation.  Except pain, patient has no other complaints.    Past Medical History:   Diagnosis Date    Allergy        History reviewed. No pertinent surgical history.    Review of patient's allergies indicates:   Allergen Reactions    Guaifenesin Hives and Shortness Of Breath    Guaifenesin-sodium citrate        No current facility-administered medications on file prior to encounter.      Current Outpatient Medications on File Prior to Encounter   Medication Sig    azelastine-fluticasone 137-50 mcg/spray [place] 1 each by Nasal route 2 (two) times daily.     Family History     Problem Relation (Age of Onset)     No Known Problems Mother, Father        Tobacco Use    Smoking status: Never Smoker    Smokeless tobacco: Never Used   Substance and Sexual Activity    Alcohol use: No    Drug use: Yes     Types: Marijuana    Sexual activity: Never     Review of Systems   Constitutional: Negative.  Negative for appetite change, fatigue and fever.   HENT: Negative.  Negative for congestion, nosebleeds and sore throat.    Eyes: Negative.  Negative for visual disturbance.   Respiratory: Negative.  Negative for cough, shortness of breath and wheezing.    Cardiovascular: Negative.  Negative for chest pain, palpitations and leg swelling.   Gastrointestinal: Negative.  Negative for abdominal pain, constipation, diarrhea, nausea and vomiting.   Endocrine: Negative.  Negative for polyuria.   Genitourinary: Negative.  Negative for dysuria, flank pain, frequency and urgency.   Musculoskeletal: Negative for arthralgias, back pain and joint swelling.        Right foot and 5th finger pain   Skin: Negative.  Negative for color change, pallor and rash.   Allergic/Immunologic: Negative.  Negative for immunocompromised state.   Neurological: Negative.  Negative for dizziness, syncope, weakness, light-headedness, numbness and headaches.   Hematological: Negative.    Psychiatric/Behavioral: Negative.  Negative for confusion and hallucinations. The patient is not nervous/anxious.    All other systems reviewed and are negative.    Objective:     Vital Signs (Most Recent):  Temp: 98.7 °F (37.1 °C) (02/21/20 0251)  Pulse: (!) 58 (02/21/20 0438)  Resp: 16 (02/21/20 0438)  BP: (!) 103/57 (02/21/20 0438)  SpO2: 98 % (02/21/20 0438) Vital Signs (24h Range):  Temp:  [98.7 °F (37.1 °C)-99.5 °F (37.5 °C)] 98.7 °F (37.1 °C)  Pulse:  [58-67] 58  Resp:  [16-18] 16  SpO2:  [98 %-100 %] 98 %  BP: (103-124)/(57-61) 103/57     Weight: 66.7 kg (147 lb 0.8 oz)  Body mass index is 19.94 kg/m².    Physical Exam   Constitutional: He is oriented to person, place, and  time. He appears well-developed and well-nourished. No distress.   HENT:   Head: Normocephalic and atraumatic.   Eyes: Pupils are equal, round, and reactive to light. Conjunctivae and EOM are normal. No scleral icterus.   Neck: Normal range of motion. Neck supple. No thyromegaly present.   Cardiovascular: Normal rate, regular rhythm and normal heart sounds.   No murmur heard.  Pulmonary/Chest: Effort normal and breath sounds normal. No respiratory distress. He has no wheezes. He exhibits no tenderness.   Abdominal: Soft. Bowel sounds are normal. There is no tenderness.   Musculoskeletal: He exhibits tenderness (Right hand in a splint.). He exhibits no edema.   Lymphadenopathy:     He has no cervical adenopathy.   Neurological: He is alert and oriented to person, place, and time. No cranial nerve deficit. He exhibits normal muscle tone. Coordination normal.   Skin: Skin is warm and dry. He is not diaphoretic. No erythema.   Psychiatric: He has a normal mood and affect. His behavior is normal. Thought content normal.   Nursing note and vitals reviewed.        CRANIAL NERVES     CN III, IV, VI   Pupils are equal, round, and reactive to light.  Extraocular motions are normal.        Significant Labs:   BMP:   Recent Labs   Lab 02/20/20  2315   GLU 88      K 4.2      CO2 24   BUN 13   CREATININE 1.2   CALCIUM 9.9     CBC:   Recent Labs   Lab 02/20/20  2315   WBC 7.79   HGB 13.6*   HCT 43.1        All pertinent labs within the past 24 hours have been reviewed.    Significant Imaging: I have reviewed and interpreted all pertinent imaging results/findings within the past 24 hours.     Imaging Results          X-Ray Hand 3 view Right (Final result)  Result time 02/20/20 21:51:29    Final result by Evans Fulton MD (02/20/20 21:51:29)                 Impression:      1.  Comminuted and angulated fractures of the 4th and 5th metacarpal shafts.    2.  Air is seen within the soft tissues overlying the 5th  metacarpal fracture site, concerning for open fracture.  Negative for radiopaque foreign bodies.    3.  Negative for acute process otherwise.      Electronically signed by: Evans Fulton MD  Date:    02/20/2020  Time:    21:51             Narrative:    EXAMINATION:  XR HAND COMPLETE 3 VIEW RIGHT    CLINICAL HISTORY:  hand injury;    TECHNIQUE:  PA, lateral, and oblique views of the right hand were performed.    COMPARISON:  None    FINDINGS:  There are comminuted and angulated fractures of the 4th and 5th metacarpal shafts.  Negative for joint surface involvement.    No other fractures are seen.  Negative for radiopaque foreign bodies.  There is a small amount of air within the soft tissues overlying the 5th metacarpal fracture site, concerning for an open fracture.    Carpal bones are well aligned.                                I have independently reviewed all pertinent labs within the past 24 hours.    I have independently reviewed, visualized and interpreted all pertinent imaging results within the past 24 hours and discussed the findings with the ED physician.            Assessment/Plan:     * Closed nondisplaced fracture of fourth and fifth metacarpal bone of right hand  Comminuted fracture of the right 4th and 5th metacarpal bone.  Dr. Garcia with orthopedics consulted.  Pain medications as needed.  Currently NPO.      Open hand fracture           VTE Risk Mitigation (From admission, onward)         Ordered     Place sequential compression device  Until discontinued      02/20/20 8960                   Juan M Mesa MD  Department of Hospital Medicine   [facility]

## 2020-02-21 NOTE — TRANSFER OF CARE
Anesthesia Transfer of Care Note    Patient: Oswaldo Prakash Jr.    Procedure(s) Performed: Procedure(s) (LRB):  ORIF, HAND (Right)    Patient location: PACU    Anesthesia Type: general    Transport from OR: Transported from OR on 100% O2 by closed face mask with adequate spontaneous ventilation    Post pain: adequate analgesia    Post assessment: no apparent anesthetic complications and tolerated procedure well    Post vital signs: stable    Level of consciousness: lethargic    Nausea/Vomiting: no nausea/vomiting    Complications: none    Transfer of care protocol was followedComments: Laryngospasm upon removal of LMA.  Patient supported with PPV and oxygen throughout.  CXR to be obtained in PACU.  Remained stable throughout episode.  Stable upon arrival to PACU.      Last vitals:   Visit Vitals  BP (!) 113/58 (BP Location: Left arm, Patient Position: Lying)   Pulse 67   Temp 36.8 °C (98.2 °F) (Temporal)   Resp 16   Ht 6' (1.829 m)   Wt 66.7 kg (147 lb 0.8 oz)   SpO2 100%   BMI 19.94 kg/m²

## 2020-02-21 NOTE — PLAN OF CARE
02/21/20 1437   Discharge Assessment   Assessment Type Discharge Planning Assessment   Confirmed/corrected address and phone number on facesheet? Yes   Assessment information obtained from? Patient   Prior to hospitilization cognitive status: Alert/Oriented   Prior to hospitalization functional status: Independent   Current cognitive status: Alert/Oriented   Current Functional Status: Independent   Lives With parent(s)   Able to Return to Prior Arrangements yes   Is patient able to care for self after discharge? Yes   Who are your caregiver(s) and their phone number(s)? Cassidy Prakash (mother) [phone]   Patient's perception of discharge disposition home or selfcare   Readmission Within the Last 30 Days no previous admission in last 30 days   Patient currently being followed by outpatient case management? No   Patient currently receives any other outside agency services? No   Equipment Currently Used at Home none   Do you have any problems affording any of your prescribed medications? No   Is the patient taking medications as prescribed? yes   Does the patient have transportation home? Yes   Transportation Anticipated family or friend will provide   Does the patient receive services at the [facility]mad[facility] Clinic? No   Discharge Plan A Home with family   DME Needed Upon Discharge  none   Patient/Family in Agreement with Plan yes     Met with pt at bedside for DC assessment. Pt lives at home with his parents and does not use any DME. Pt stated that he can not swallow pills and requested that his medications come in liquid format. Binr forwarded request to pt's attending MD. No other CM DC needs identified at this time. SWer provided a transitional care folder, information on advanced directives, information on pharmacy bedside delivery, and discharge planning begins on admission with contact information for any needs/questions. Pt opted for bedside medication delivery. His typical pharmacy is [facility] on [address].  Information below.    [facility] [place] [address] [facility] [place] [facility] [address] [facility]  [address] [facility] [address]  Phone: [phone] Fax: [phone]    Sixto Reid LMSW 2/21/2020 2:40 PM

## 2020-02-21 NOTE — DISCHARGE SUMMARY
[facility] Hospital Medicine  Discharge Summary      Patient Name: Oswaldo Prakash Jr.  MRN: [id number]  Admission Date: 2/20/2020  Hospital Length of Stay: 0 days  Discharge Date and Time:  02/21/2020 3:14 PM  Attending Physician: Juan M Mesa MD   Discharging Provider: CARLITA Ricks  Primary Care Provider: Primary Doctor No      HPI:   Mr. Prakash is a young 24-year-old  male with no medical problems, was at a bowling alley, was trying to fetch a ball, and another ball came right through the return machine and his right hand got stuck between two heavy balls.  Patient immediately started complaining of right 4th and 5th finger pain, hence presented to the ED.  X-ray revealed comminuted and angulated fractures of the 4th and 5th metacarpal shafts. Air is seen within the soft tissues overlying the 5th metacarpal fracture site, concerning for open fracture.  Negative for radiopaque foreign bodies.  Patient was placed in a splint.  Dr. Garcia with Orthopedics was consulted recommended admission for further evaluation.  Except pain, patient has no other complaints.    Procedure(s) (LRB):  ORIF, HAND (Right)      Hospital Course:   Patient was kept on OBS for closed nondisplaced fracture of fourth and fifth metacarpal bone of R hand under the care of [facility]. Dr. Garcia performed ORIF and pt tolerated well. He will dc home: NWB to R arm X 2 weeks, at which time he will f/u with Ortho. He reports he is unable to swallow pills, liquid hydrocodone called to pharmacy for pain control. All was discussed with patient and he verbalized understanding. Patient was seen and examined today and deemed stable for discharge home.     Consults:   Consults (From admission, onward)        Status Ordering Provider     Inpatient consult to Orthopedic Surgery  Once     Provider:  Rafiq Garcia MD    Completed ROXANNA VALDEZ          No new Assessment & Plan notes have been filed  under this hospital service since the last note was generated.  Service: Hospital Medicine    Final Active Diagnoses:    Diagnosis Date Noted POA    PRINCIPAL PROBLEM:  Closed nondisplaced fracture of fourth and fifth metacarpal bone of right hand [S62.304A] 02/21/2020 Yes    Right hand pain [M79.641] 02/21/2020 Yes    Open hand fracture [S62.90XB] 02/20/2020 Yes      Problems Resolved During this Admission:       Discharged Condition: stable    Disposition: Home or Self Care    Follow Up:  Follow-up Information     Rafiq Garcia MD In 2 weeks.    Specialty:  Orthopedic Surgery  Contact information:  [address] [facility] [address] [phone]                 Patient Instructions:      Diet Adult Regular     Notify your health care provider if you experience any of the following:  temperature >100.4     Notify your health care provider if you experience any of the following:  severe uncontrolled pain     Leave dressing on - Keep it clean, dry, and intact until clinic visit     Weight bearing restrictions (specify):   Order Comments: NWB to R upper extremity until MD clearance       Significant Diagnostic Studies: Labs:   BMP:   Recent Labs   Lab 02/20/20  2315   GLU 88      K 4.2      CO2 24   BUN 13   CREATININE 1.2   CALCIUM 9.9   , CMP   Recent Labs   Lab 02/20/20  2315      K 4.2      CO2 24   GLU 88   BUN 13   CREATININE 1.2   CALCIUM 9.9   ANIONGAP 10   ESTGFRAFRICA >60   EGFRNONAA >60   , CBC   Recent Labs   Lab 02/20/20  2315   WBC 7.79   HGB 13.6*   HCT 43.1       and All labs within the past 24 hours have been reviewed    Pending Diagnostic Studies:     None         Medications:  Reconciled Home Medications:      Medication List      START taking these medications    hydrocodone-apap 7.5-325 MG/15 ML oral solution  Commonly known as:  HYCET  Take 15 mLs by mouth every 4 (four) hours as needed for Pain.        CONTINUE taking these medications     azelastine-fluticasone 137-50 mcg/spray Spry nassal spray  Commonly known as:  DYMISTA  1 each by Nasal route 2 (two) times daily.            Indwelling Lines/Drains at time of discharge:   Lines/Drains/Airways     None                 Time spent on the discharge of patient: 50 minutes  Patient was seen and examined on the date of discharge and determined to be suitable for discharge.         NARGIS Ricks-KIM  Department of Hospital Medicine  [facility]

## 2020-02-21 NOTE — HPI
Mr. Prakash is a young 24-year-old  male with no medical problems, was at a bowling alley, was trying to fetch a ball, and another ball came right through the return machine and his right hand got stuck between two heavy balls.  Patient immediately started complaining of right 4th and 5th finger pain, hence presented to the ED.  X-ray revealed comminuted and angulated fractures of the 4th and 5th metacarpal shafts. Air is seen within the soft tissues overlying the 5th metacarpal fracture site, concerning for open fracture.  Negative for radiopaque foreign bodies.  Patient was placed in a splint.  Dr. Garcia with Orthopedics was consulted recommended admission for further evaluation.  Except pain, patient has no other complaints.

## 2020-02-21 NOTE — CONSULTS
[facility]   Orthopedics  Consult Note    Patient Name: Oswaldo Prakash Jr.  MRN: [id number]  Admission Date: 2/20/2020  Hospital Length of Stay: 0 days  Attending Provider: Juan M Mesa MD  Primary Care Provider: Primary Doctor No    Patient information was obtained from patient and ER records.     Consults  Subjective:     Principal Problem:Closed nondisplaced fracture of fourth metacarpal bone of right hand    Chief Complaint:   Chief Complaint   Patient presents with    Hand Pain     pt reports bowling b all smashed his hand. + swelling and deformity noted to right hand. pt is able to move digit         HPI:  Reports acute onset of right 4th and 5th finger pain and hand deformity with wound. Indicates his hand was stuck in between 2 bowling balls.  Patient evaluated the emergency room. At that time antibiotics were administered irrigation performed splint placed.  Patient was admitted for observation for fixation the following morning.  During our interview complained of constant pain.  Rates the pain 6 out 10.  Pain aggravated by holding objects and finger movement.  Associated symptoms were wound.  Specifically denies any sensory symptoms. He is ambidextrous.  Past Medical History:   Diagnosis Date    Allergy        History reviewed. No pertinent surgical history.    Review of patient's allergies indicates:   Allergen Reactions    Guaifenesin Hives and Shortness Of Breath    Guaifenesin-sodium citrate        Current Facility-Administered Medications   Medication    acetaminophen tablet 650 mg    aluminum-magnesium hydroxide-simethicone 200-200-20 mg/5 mL suspension 30 mL    ondansetron injection 4 mg    oxyCODONE-acetaminophen  mg per tablet 1 tablet    oxyCODONE-acetaminophen 5-325 mg per tablet 1 tablet    sodium chloride 0.9% flush 10 mL     Family History     Problem Relation (Age of Onset)    No Known Problems Mother, Father        Tobacco Use    Smoking status: Never  Smoker    Smokeless tobacco: Never Used   Substance and Sexual Activity    Alcohol use: No    Drug use: Yes     Types: Marijuana    Sexual activity: Never     ROS  Objective:     Vital Signs (Most Recent):  Temp: 97.8 °F (36.6 °C) (02/21/20 1217)  Pulse: (!) 52 (02/21/20 1217)  Resp: 18 (02/21/20 1217)  BP: (!) 106/59 (02/21/20 1217)  SpO2: 98 % (02/21/20 1217) Vital Signs (24h Range):  Temp:  [97.6 °F (36.4 °C)-99.5 °F (37.5 °C)] 97.8 °F (36.6 °C)  Pulse:  [50-84] 52  Resp:  [16-35] 18  SpO2:  [98 %-100 %] 98 %  BP: (103-124)/(54-61) 106/59     Weight: 66.7 kg (147 lb 0.8 oz)  Height: 6' (182.9 cm)  Body mass index is 19.94 kg/m².      Intake/Output Summary (Last 24 hours) at 2/21/2020 1336  Last data filed at 2/21/2020 1123  Gross per 24 hour   Intake 50 ml   Output 5 ml   Net 45 ml       Ortho/SPM Exam   Splint intact.  Accessible compartments are soft.  Active flexion extension all 5 digits.  Light touch sensation intact.  Brisk cap refill.    Significant Labs: All pertinent labs within the past 24 hours have been reviewed.    Significant Imaging: I have reviewed and interpreted all pertinent imaging results/findings.    Assessment/Plan:  Open displaced 4th and 5th metacarpal fractures, right hand  Treatment options were discussed in detail.  Recommending open reduction internal fixation with irrigation debridement.  Patient concurs with recommendation.  He should be medically optimized.  NPO after midnight.  Anticipate surgery hospital day 1.     Active Diagnoses:    Diagnosis Date Noted POA    PRINCIPAL PROBLEM:  Closed nondisplaced fracture of fourth and fifth metacarpal bone of right hand [S62.304A] 02/21/2020 Yes    Right hand pain [M79.641] 02/21/2020 Yes    Open hand fracture [S62.90XB] 02/20/2020 Yes      Problems Resolved During this Admission:       Thank you for your consult. I will follow-up with patient. Please contact us if you have any additional questions.    Rafiq Garcia  MD  Orthopedics  [facility]

## 2020-02-21 NOTE — ASSESSMENT & PLAN NOTE
Comminuted fracture of the right 4th and 5th metacarpal bone.  Dr. Garcia with orthopedics consulted.  Pain medications as needed.  Currently NPO.

## 2020-02-21 NOTE — PLAN OF CARE
Arrived from the ER via wheelchair. AAOX3. Reports some pain to the right arm 5/10. Denies any other complaints. Side effects of anesthesia and expectations during recovery discussed. Pt verbalized understanding. Questions answered. Will cont to monitor.

## 2020-02-21 NOTE — ANESTHESIA POSTPROCEDURE EVALUATION
Anesthesia Post Evaluation    Patient: Oswaldo Prakash Jr.    Procedure(s) Performed: Procedure(s) (LRB):  ORIF, HAND (Right)    Final Anesthesia Type: general    Patient location during evaluation: PACU  Patient participation: Yes- Able to Participate  Level of consciousness: awake and alert  Post-procedure vital signs: reviewed and stable  Pain management: adequate  Airway patency: patent  HEMANT mitigation strategies: Extubation while patient is awake  PONV status at discharge: No PONV  Anesthetic complications: no      Cardiovascular status: hemodynamically stable  Respiratory status: spontaneous ventilation  Hydration status: euvolemic  Follow-up not needed.          Vitals Value Taken Time   /59 2/21/2020 12:17 PM   Temp 36.6 °C (97.8 °F) 2/21/2020 12:17 PM   Pulse 52 2/21/2020 12:17 PM   Resp 18 2/21/2020 12:17 PM   SpO2 98 % 2/21/2020 12:17 PM         Event Time     Out of Recovery 02/21/2020 12:05:32          Pain/Jenelle Score: Pain Rating Prior to Med Admin: 8 (2/21/2020  3:40 AM)  Jenelle Score: 9 (2/21/2020 12:00 PM)

## 2020-02-21 NOTE — ED PROVIDER NOTES
SCRIBE #1 NOTE: IGreg, am scribing for, and in the presence of, Geoff Loera NP. I have scribed the HPI, ROS, PEX.     SCRIBE #2 NOTE: IDeborah, am scribing for, and in the presence of,  Geoff Loera NP. I have scribed the remaining portions of the note not scribed by Scribe #1.      History     Chief Complaint   Patient presents with    Hand Pain     pt reports bowling b all smashed his hand. + swelling and deformity noted to right hand. pt is able to move digit      Review of patient's allergies indicates:   Allergen Reactions    Guaifenesin Hives and Shortness Of Breath    Guaifenesin-sodium citrate          History of Present Illness     HPI    2/20/2020, 9:23 PM  History obtained from the patient      History of Present Illness: Oswaldo Prakash is a 24 y.o. male patient who presents to the Emergency Department for evaluation of right hand pain. PTA, pt was at the bowling alley when a ball came through the ball return machine. The pt's right hand was caught in between two bowling balls. Symptoms are constant and moderate in severity. No mitigating or exacerbating factors reported. Associated sxs include swelling to the right hand. Patient denies any CP, SOB, fever, chills, HA, dizziness, N/V/D, and all other sxs at this time. No prior Tx reported. Pt is unsure if he up-to-date on his tetanus shot. No further complaints or concerns at this time.       Arrival mode: Personal vehicle    PCP: unknown        Past Medical History:  Past Medical History:   Diagnosis Date    Allergy        Past Surgical History:  History reviewed. No pertinent surgical history.      Family History:  Family History   Problem Relation Age of Onset    No Known Problems Mother     No Known Problems Father     Melanoma Neg Hx     Psoriasis Neg Hx     Lupus Neg Hx     Eczema Neg Hx        Social History:  Social History     Tobacco Use    Smoking status: Never Smoker    Smokeless tobacco:  Never Used   Substance and Sexual Activity    Alcohol use: No    Drug use: No    Sexual activity: Never        Review of Systems     Review of Systems   Constitutional: Negative for chills and fever.   HENT: Negative for sore throat.    Respiratory: Negative for shortness of breath.    Cardiovascular: Negative for chest pain.   Gastrointestinal: Negative for abdominal pain, diarrhea, nausea and vomiting.   Genitourinary: Negative for dysuria.   Musculoskeletal: Negative for back pain.        (+) right hand pain  (+) right hand swelling   Skin: Negative for rash.   Neurological: Negative for dizziness, weakness and headaches.   Hematological: Does not bruise/bleed easily.   All other systems reviewed and are negative.       Physical Exam     Initial Vitals [02/20/20 2119]   BP Pulse Resp Temp SpO2   (!) 121/59 64 16 99.5 °F (37.5 °C) 99 %      MAP       --          Physical Exam  Nursing Notes and Vital Signs Reviewed.  Constitutional: Patient is in no acute distress. Well-developed and well-nourished.  Head: Atraumatic. Normocephalic.  Eyes: PERRL. EOM intact. Conjunctivae are not pale. No scleral icterus.  ENT: Mucous membranes are moist. Oropharynx is clear and symmetric.    Neck: Supple. Full ROM. No lymphadenopathy.  Cardiovascular: Regular rate. Regular rhythm. No murmurs, rubs, or gallops. Distal pulses are 2+ and symmetric.  Pulmonary/Chest: No respiratory distress. Clear to auscultation bilaterally. No wheezing or rales.  Abdominal: Soft and non-distended.  There is no tenderness.  No rebound, guarding, or rigidity. Good bowel sounds.  Genitourinary: No CVA tenderness  Musculoskeletal: Moves all extremities. No obvious deformities. No edema. No calf tenderness.  Right Hand: No obvious deformity. There is tenderness to the 5th metacarpal. There is mild swelling and bruising over the distal dorsal aspect of the 5th metacarpal. Pt moves all fingers. Full flexion and extension of the wrist. Radial, median,  and ulnar nerves are intact. Radial and ulnar pulses are 2+. Normal capillary refill.  Distal sensation is intact. NVI distally. No snuff box tenderness.   Skin: There is a 0.5 cm laceration of the dorsal aspect of right hand. Warm and dry.  Neurological:  Alert, awake, and appropriate.  Normal speech.  No acute focal neurological deficits are appreciated.  Psychiatric: Normal affect. Good eye contact. Appropriate in content.     ED Course   Splint Application  Date/Time: 2020 10:26 PM  Performed by: Geoff Loera NP  Authorized by: Geoff Loera NP   Consent Done: Yes  Consent: Verbal consent obtained.  Consent given by: patient  Patient identity confirmed: , name and verbally with patient  Location details: right hand  Splint type: ulnar gutter  Post-procedure: The splinted body part was neurovascularly unchanged following the procedure.  Patient tolerance: Patient tolerated the procedure well with no immediate complications  Comments: Wound was irrigated.        ED Vital Signs:  Vitals:    20 2119   BP: (!) 121/59   Pulse: 64   Resp: 16   Temp: 99.5 °F (37.5 °C)   TempSrc: Oral   SpO2: 99%   Weight: 66.7 kg (147 lb 0.8 oz)   Height: 6' (1.829 m)       Abnormal Lab Results:  Labs Reviewed   CBC W/ AUTO DIFFERENTIAL   BASIC METABOLIC PANEL       Imaging Results:  Imaging Results          X-Ray Hand 3 view Right (Final result)  Result time 20 21:51:29    Final result by Evans Fulton MD (20 21:51:29)                 Impression:      1.  Comminuted and angulated fractures of the 4th and 5th metacarpal shafts.    2.  Air is seen within the soft tissues overlying the 5th metacarpal fracture site, concerning for open fracture.  Negative for radiopaque foreign bodies.    3.  Negative for acute process otherwise.      Electronically signed by: Evans Fulton MD  Date:    2020  Time:    21:51             Narrative:    EXAMINATION:  XR HAND COMPLETE 3 VIEW RIGHT    CLINICAL  HISTORY:  hand injury;    TECHNIQUE:  PA, lateral, and oblique views of the right hand were performed.    COMPARISON:  None    FINDINGS:  There are comminuted and angulated fractures of the 4th and 5th metacarpal shafts.  Negative for joint surface involvement.    No other fractures are seen.  Negative for radiopaque foreign bodies.  There is a small amount of air within the soft tissues overlying the 5th metacarpal fracture site, concerning for an open fracture.    Carpal bones are well aligned.                                        The Emergency Provider reviewed the vital signs and test results, which are outlined above.     ED Discussion     10:23 PM: Discussed pt's case with Dr. Garcia (ortho) who recommends admit to medicine for IV Abx and will do surgery tomorrow. Irrigate and place in ulnar gutter splint.     10:39 PM: Discussed case with Dr. Mesa ([facility]). Dr. Mesa agrees with current care and management of pt and accepts admission.   Admitting Service: [facility] medicine  Admitting Physician: Dr. Mesa  Admit to: observation    10:42 PM: Re-evaluated pt. I have discussed test results, shared treatment plan, and the need for admission with patient and family at bedside. Pt and family express understanding at this time and agree with all information. All questions answered. Pt and family have no further questions or concerns at this time. Pt is ready for admit.           Medical Decision Making:   Clinical Tests:   Radiological Study: Ordered and Reviewed           ED Medication(s):  Medications   ceFAZolin (ANCEF) 1 gram in dextrose 5 % 50 mL IVPB (premix) (has no administration in time range)   acetaminophen tablet 650 mg (has no administration in time range)   ondansetron injection 4 mg (has no administration in time range)   oxyCODONE-acetaminophen 5-325 mg per tablet 1 tablet (has no administration in time range)   oxyCODONE-acetaminophen  mg per tablet 1 tablet (has no  administration in time range)   aluminum-magnesium hydroxide-simethicone 200-200-20 mg/5 mL suspension 30 mL (has no administration in time range)   morphine injection 4 mg (has no administration in time range)   ondansetron disintegrating tablet 4 mg (has no administration in time range)   Tdap vaccine injection 0.5 mL (0.5 mLs Intramuscular Given 2/20/20 8817)       Scribe Attestation:   Scribe #1: I performed the above scribed service and the documentation accurately describes the services I performed. I attest to the accuracy of the note.     Attending:   Physician Attestation Statement for Scribe #1: I, Geoff Loera NP, personally performed the services described in this documentation, as scribed by Greg Marquis, in my presence, and it is both accurate and complete.       Scribe Attestation:   Scribe #2: I performed the above scribed service and the documentation accurately describes the services I performed. I attest to the accuracy of the note.    Attending Attestation:           Physician Attestation for Scribe:    Physician Attestation Statement for Scribe #2: I, Geoff Loera NP, reviewed documentation, as scribed by Deborah Hurd in my presence, and it is both accurate and complete. I also acknowledge and confirm the content of the note done by Aldoibe #1.           Clinical Impression       ICD-10-CM ICD-9-CM   1. Right hand pain M79.641 729.5   2. Open hand fracture S62.90XB 815.10       Disposition:   Disposition: Placed in Observation  Condition: Stable         Geoff Loera NP  02/20/20 2247       Geoff Lorea NP  02/20/20 2248

## 2020-02-22 ENCOUNTER — NURSE TRIAGE (OUTPATIENT)
Dept: ADMINISTRATIVE | Facility: CLINIC | Age: 25
End: 2020-02-22

## 2020-02-22 ENCOUNTER — HOSPITAL ENCOUNTER (EMERGENCY)
Facility: HOSPITAL | Age: 25
Discharge: HOME | End: 2020-02-22
Attending: INTERNAL MEDICINE
Payer: COMMERCIAL

## 2020-02-22 VITALS
TEMPERATURE: 99 F | SYSTOLIC BLOOD PRESSURE: 108 MMHG | HEIGHT: 72 IN | BODY MASS INDEX: 19.83 KG/M2 | RESPIRATION RATE: 18 BRPM | DIASTOLIC BLOOD PRESSURE: 71 MMHG | WEIGHT: 146.38 LBS | HEART RATE: 60 BPM | OXYGEN SATURATION: 97 %

## 2020-02-22 DIAGNOSIS — G89.18 POST-OPERATIVE PAIN: Primary | ICD-10-CM

## 2020-02-22 PROCEDURE — 99282 EMERGENCY DEPT VISIT SF MDM: CPT

## 2020-02-22 NOTE — TELEPHONE ENCOUNTER
Reason for Disposition   Cast problems or questions   [1] Caller has URGENT question AND [2] triager unable to answer question    Additional Information   Negative: Sounds like a life-threatening emergency to the triager   Negative: Chest pain   Negative: Difficulty breathing   Negative: Surgical incision symptoms and questions   Negative: [1] Discomfort (pain, burning or stinging) when passing urine AND [2] male   Negative: [1] Discomfort (pain, burning or stinging) when passing urine AND [2] female   Negative: Constipation   Negative: New or worsening leg (calf, thigh) pain   Negative: Dizziness is severe, or persists > 24 hours after surgery   Negative: New or worsening leg swelling   Negative: Pain, redness, swelling, or pus at IV Site   Negative: Symptoms arising from use of a urinary catheter (Hernández or Coude)   Negative: Splint or elastic bandage problems or questions   Negative: Symptoms or questions after surgery   Negative: Chest pain   Negative: Difficulty breathing   Negative: [1] SEVERE pain (e.g., excruciating, pain scale 8-10) under cast AND [2] not improved after pain medications and elevation   Negative: [1] SEVERE pain of fingers or toes AND [2] not improved after pain medications and elevation   Negative: [1] Numbness (loss of sensation) of fingers or toes AND [2] persists after 1 hour of elevation   Negative: [1] Tingling (pins and needles sensation) of fingers or toes AND [2] persists after 1 hour of elevation   Negative: Blueness or pallor of fingers or toes (compared to non-injured side)   Negative: Patient sounds very sick or weak to the triager   Negative: [1] Increasing pain under cast AND [2] cast put on > 24 hours ago AND [3] not improved after elevation   Negative: Can't wiggle fingers or toes   Negative: Drainage comes through cast or out of end of cast    Protocols used: POST-OP SYMPTOMS AND [id number], CAST SYMPTOMS AND [id number]    Pt's Mother  stated he had surgery on his right hand yesterday and now his hand is burning. Denies pain but stated he feels that is hand is very hot and it's a constant burning sensation. Stated Pt is saying the cast is too tight and that he wants to take it off. Unable to reach on Call Provider for that team (per ) Advised to take Pt to ED  for evaluation. Mother verbalized understanding.

## 2020-02-22 NOTE — ED PROVIDER NOTES
SCRIBE #1 NOTE: I, Christy Dee, am scribing for, and in the presence of, Sanford Ramírez NP. I have scribed the entire note.      History      Chief Complaint   Patient presents with    Hand Pain     pt had right hand surgery yesterday and states that he is having pain/burning to the outter lateral portion of the hand       Review of patient's allergies indicates:   Allergen Reactions    Guaifenesin Hives and Shortness Of Breath    Guaifenesin-sodium citrate         HPI   HPI    2/22/2020, 2:47 PM   History obtained from the patient      History of Present Illness: Oswaldo Prakash Jr. is a 24 y.o. male patient who presents to the Emergency Department for R hand pain which onset PTA. Pt had surgery yesterday to R hand and was put in a splint. Symptoms are constant and moderate in severity. No mitigating or exacerbating factors reported. Associated sxs include burning to R hand. Patient denies any fever, chills, n/v/d, weakness, numbness, HA, and all other sxs at this time. No prior Tx reported. No further complaints or concerns at this time.       Arrival mode: Personal vehicle     PCP: Primary Doctor No       Past Medical History:  Past Medical History:   Diagnosis Date    Allergy        Past Surgical History:  History reviewed. No pertinent past surgical history.     Family History:  Family History   Problem Relation Age of Onset    No Known Problems Mother     No Known Problems Father     Melanoma Neg Hx     Psoriasis Neg Hx     Lupus Neg Hx     Eczema Neg Hx        Social History:  Social History     Tobacco Use    Smoking status: Never Smoker    Smokeless tobacco: Never Used   Substance and Sexual Activity    Alcohol use: No    Drug use: Yes     Types: Marijuana    Sexual activity: Never       ROS   Review of Systems   Constitutional: Negative for chills and fever.   HENT: Negative for sore throat.    Respiratory: Negative for shortness of breath.    Cardiovascular: Negative for chest pain.    Gastrointestinal: Negative for diarrhea, nausea and vomiting.   Genitourinary: Negative for dysuria.   Musculoskeletal: Negative for back pain.        (+) R hand pain  (+) R hand burning   Skin: Negative for rash.   Neurological: Negative for weakness, numbness and headaches.   Hematological: Does not bruise/bleed easily.     Physical Exam      Initial Vitals [02/22/20 1441]   BP Pulse Resp Temp SpO2   108/71 60 18 98.7 °F (37.1 °C) 97 %      MAP       --          Physical Exam  Nursing Notes and Vital Signs Reviewed. Constitutional: Patient is in no acute distress. Well-developed and well-nourished.  Head: Atraumatic. Normocephalic.  Eyes: PERRL. EOM intact. Conjunctivae are not pale. No scleral icterus.  ENT: Mucous membranes are moist. Oropharynx is clear and symmetric.    Neck: Supple. Full ROM. No lymphadenopathy.  Cardiovascular: Regular rate. Regular rhythm. No murmurs, rubs, or gallops. Distal pulses are 2+ and symmetric.  Pulmonary/Chest: No respiratory distress. Clear to auscultation bilaterally. No wheezing or rales.  Abdominal: Soft and non-distended.  There is no tenderness.  No rebound, guarding, or rigidity. Good bowel sounds.  Genitourinary: No CVA tenderness  Musculoskeletal: Moves all extremities. No obvious deformities. No edema. No calf tenderness.  Right Hand: Fingers are warm and pink. No pain with extension. Post surgical dressing was left intact. No bleeding from bandage.  Skin: Warm and dry.  Neurological:  Alert, awake, and appropriate.  Normal speech.  No acute focal neurological deficits are appreciated.  Psychiatric: Normal affect. Good eye contact. Appropriate in content.    ED Course    Procedures  ED Vital Signs:  Vitals:    02/22/20 1441   BP: 108/71   Pulse: 60   Resp: 18   Temp: 98.7 °F (37.1 °C)   TempSrc: Oral   SpO2: 97%   Weight: 66.4 kg (146 lb 6.2 oz)   Height: 6' (1.829 m)       Abnormal Lab Results:  Labs Reviewed - No data to display     All Lab Results:    Imaging  Results:  Imaging Results    None                 The Emergency Provider reviewed the vital signs and test results, which are outlined above.    ED Discussion     3:00 PM: Pt was in a splint, ace bandage was removed and pt felt immediate relief. New ace bandage was applied.     3:02 PM: Reassessed pt at this time.  Pt states his condition has improved at this time. Discussed with pt all pertinent ED information and results. Discussed pt dx and plan of tx. Gave pt all f/u and return to the ED instructions. All questions and concerns were addressed at this time. Pt expresses understanding of information and instructions, and is comfortable with plan to discharge. Pt is stable for discharge.    I discussed with patient and/or family/caretaker that evaluation in the ED does not suggest any emergent or life threatening medical conditions requiring immediate intervention beyond what was provided in the ED, and I believe patient is safe for discharge.  Regardless, an unremarkable evaluation in the ED does not preclude the development or presence of a serious of life threatening condition. As such, patient was instructed to return immediately for any worsening or change in current symptoms.         ED Medication(s):  Medications - No data to display    New Prescriptions    No medications on file        Medication List      ASK your doctor about these medications    azelastine-fluticasone 137-50 mcg/spray Spry nassal spray  Commonly known as:  DYMISTA  1 each by Nasal route 2 (two) times daily.     hydrocodone-apap 7.5-325 MG/15 ML oral solution  Commonly known as:  HYCET  Take 15 mLs by mouth every 4 (four) hours as needed for Pain.            Follow-up Information     [facility]. Schedule an appointment as soon as possible for a visit in 2 days.    Contact information:  [phone]                   Medical Decision Making              Scribe Attestation:   Scribe #1: I performed the above scribed service and  the documentation accurately describes the services I performed. I attest to the accuracy of the note.    Attending:   Physician Attestation Statement for Scribe #1: I, Sanford Ramírez NP, personally performed the services described in this documentation, as scribed by Christy Dee, in my presence, and it is both accurate and complete.          Clinical Impression       ICD-10-CM ICD-9-CM   1. Post-operative pain G89.18 338.18       Disposition:   Disposition: Discharged  Condition: Stable         Sanford Ramírez NP  02/22/20 1506

## 2020-02-24 ENCOUNTER — HOSPITAL ENCOUNTER (OUTPATIENT)
Dept: RADIOLOGY | Facility: HOSPITAL | Age: 25
Discharge: HOME | End: 2020-02-24
Attending: ORTHOPAEDIC SURGERY
Payer: COMMERCIAL

## 2020-02-24 ENCOUNTER — OFFICE VISIT (OUTPATIENT)
Dept: ORTHOPEDICS | Facility: CLINIC | Age: 25
End: 2020-02-24
Payer: COMMERCIAL

## 2020-02-24 VITALS
SYSTOLIC BLOOD PRESSURE: 96 MMHG | WEIGHT: 146 LBS | DIASTOLIC BLOOD PRESSURE: 49 MMHG | BODY MASS INDEX: 19.77 KG/M2 | HEART RATE: 53 BPM | HEIGHT: 72 IN

## 2020-02-24 DIAGNOSIS — S62.304A CLOSED NONDISPLACED FRACTURE OF FOURTH METACARPAL BONE OF RIGHT HAND, UNSPECIFIED PORTION OF METACARPAL, INITIAL ENCOUNTER: Primary | ICD-10-CM

## 2020-02-24 DIAGNOSIS — M79.641 RIGHT HAND PAIN: ICD-10-CM

## 2020-02-24 DIAGNOSIS — M79.641 RIGHT HAND PAIN: Primary | ICD-10-CM

## 2020-02-24 PROCEDURE — 73130 X-RAY EXAM OF HAND: CPT | Mod: TC,RT

## 2020-02-24 PROCEDURE — 99999 PR PBB SHADOW E&M-EST. PATIENT-LVL III: ICD-10-PCS | Mod: PBBFAC,,,

## 2020-02-24 PROCEDURE — 73130 XR HAND COMPLETE 3 VIEW RIGHT: ICD-10-PCS | Mod: 26,RT,, | Performed by: RADIOLOGY

## 2020-02-24 PROCEDURE — 99999 PR PBB SHADOW E&M-EST. PATIENT-LVL III: CPT | Mod: PBBFAC,,,

## 2020-02-24 PROCEDURE — 73130 X-RAY EXAM OF HAND: CPT | Mod: 26,RT,, | Performed by: RADIOLOGY

## 2020-02-24 NOTE — PROGRESS NOTES
Post-Op Progress Note    Subjective:    Oswaldo Prakash Jr. is a 24 y.o.  male s/p open reduction internal fixation 4th and 5th metacarpals 3 days ago with Dr. Garcia.  Patient went to the emergency department 1 day after surgery due to increased pain.  After unwrapping Ace bandage patient has significant relief.  Patient was told to follow up in with us in clinic.  Today patient reports continued moderate amount of pain and swelling to all digits. He has been taking his pain medicine as prescribed      Objective:  Neurovascularly intact  Adequate capillary refill  Able to wiggle exposed fingers, able to feel light touch sensation  Compartments soft      Imaging:  X-rays the right hand shows hardware without breakage or complication, fracture alignment is maintained  -please see radiologist dictation for complete report      Assessment/Plan:    Encounter Diagnosis   Name Primary?    Closed nondisplaced fracture of fourth metacarpal bone of right hand, unspecified portion of metacarpal, initial encounter Yes     Patient's open reduction internal fixation looks to have maintained its integrity and fracture alignment unchanged from postop films.  Patient was instructed to elevate extremity for swelling which may help with symptom relief.  Take pain medicine as previously prescribed.  Patient was counseled to eat a high-protein diet as well as staying hydrated as his pressure is slightly hypotensive today. Follow-up at postop scheduled visit.    Follow-up:  At previously scheduled postoperative visit 2 weeks from the date of surgery      -Discussed findings with patient. Patient expressed understanding. Patient was given the opportunity to ask questions and be an active participant in their medical care. Patient had no further questions or concerns at this time.

## 2020-03-06 ENCOUNTER — HOSPITAL ENCOUNTER (OUTPATIENT)
Dept: RADIOLOGY | Facility: HOSPITAL | Age: 25
Discharge: HOME | End: 2020-03-06
Attending: PHYSICIAN ASSISTANT
Payer: COMMERCIAL

## 2020-03-06 ENCOUNTER — OFFICE VISIT (OUTPATIENT)
Dept: ORTHOPEDICS | Facility: CLINIC | Age: 25
End: 2020-03-06
Payer: COMMERCIAL

## 2020-03-06 VITALS
WEIGHT: 146 LBS | HEART RATE: 57 BPM | DIASTOLIC BLOOD PRESSURE: 63 MMHG | SYSTOLIC BLOOD PRESSURE: 102 MMHG | BODY MASS INDEX: 19.77 KG/M2 | HEIGHT: 72 IN

## 2020-03-06 DIAGNOSIS — M79.641 RIGHT HAND PAIN: Primary | ICD-10-CM

## 2020-03-06 DIAGNOSIS — S62.304A CLOSED NONDISPLACED FRACTURE OF FOURTH METACARPAL BONE OF RIGHT HAND, UNSPECIFIED PORTION OF METACARPAL, INITIAL ENCOUNTER: ICD-10-CM

## 2020-03-06 DIAGNOSIS — S62.304A CLOSED NONDISPLACED FRACTURE OF FOURTH METACARPAL BONE OF RIGHT HAND, UNSPECIFIED PORTION OF METACARPAL, INITIAL ENCOUNTER: Primary | ICD-10-CM

## 2020-03-06 PROCEDURE — 73130 X-RAY EXAM OF HAND: CPT | Mod: TC,RT

## 2020-03-06 PROCEDURE — 99999 PR PBB SHADOW E&M-EST. PATIENT-LVL III: CPT | Mod: PBBFAC,,,

## 2020-03-06 PROCEDURE — 99999 PR PBB SHADOW E&M-EST. PATIENT-LVL III: ICD-10-PCS | Mod: PBBFAC,,,

## 2020-03-06 PROCEDURE — 73130 X-RAY EXAM OF HAND: CPT | Mod: 26,RT,, | Performed by: RADIOLOGY

## 2020-03-06 PROCEDURE — 73130 XR HAND COMPLETE 3 VIEW RIGHT: ICD-10-PCS | Mod: 26,RT,, | Performed by: RADIOLOGY

## 2020-03-06 NOTE — LETTER
March 6, 2020      [facility]  [address] [place], [place] [address] [address]  Phone: [phone]  Fax: [phone]       Patient: Oswaldo Prakash   YOB: 1995  Date of Visit: 03/06/2020    To Whom It May Concern:    Lexi Prakash  was at [facility] on 03/06/2020. His mother Ildefonso Prakash accompanied her son Mr. Chacon to his appointment. If you have any questions or concerns, or if I can be of further assistance, please do not hesitate to contact me.    Sincerely,      Zachariah GANDARA / More Flores MA

## 2020-03-06 NOTE — PROGRESS NOTES
Post-Op Progress Note    Subjective:    Oswaldo Prakash Jr. is a 24 y.o.  male s/p ORIF of right 4th and 5th metatarsals 2 weeks ago with Dr. Garcia. Patient was seen 10 days ago after increased pain post operatively. Pain was relieved upon loosening ace wrap.       Objective:  NVI  Incisions well approximated, no signs of infection. Sutures in place  Able to feel light touch sensation in ulnar, radial, median nerve distributions.  Tender to palpation over fracture sites  ROM flexion of all digits limited at DIP PIP MCP      Imaging: Xray of the right hand shows fracture alignment of both the 4th and 5th metatarsals maintained. No signs of hardware complication or breakage   -please see radiologist dictation for complete report      Assessment/Plan:    Encounter Diagnosis   Name Primary?    Closed nondisplaced fracture of fourth metacarpal bone of right hand, unspecified portion of metacarpal, initial encounter Yes     Patients post op splint to be removed along with sutures. He will be place into a velcro ulnar gutter splint. He may work on ROM of his 1st three digits while remaining in his splint. Remain NWB. F/u in 2 weeks for repeat xrays with anticipated progression to full ROM    Follow-up: in 2 weeks with xrays      -Discussed findings with patient. Patient expressed understanding. Patient was given the opportunity to ask questions and be an active participant in their medical care. Patient had no further questions or concerns at this time.

## 2020-03-18 ENCOUNTER — TELEPHONE (OUTPATIENT)
Dept: ORTHOPEDICS | Facility: CLINIC | Age: 25
End: 2020-03-18

## 2020-03-20 ENCOUNTER — HOSPITAL ENCOUNTER (OUTPATIENT)
Dept: RADIOLOGY | Facility: HOSPITAL | Age: 25
Discharge: HOME | End: 2020-03-20
Attending: ORTHOPAEDIC SURGERY
Payer: COMMERCIAL

## 2020-03-20 ENCOUNTER — OFFICE VISIT (OUTPATIENT)
Dept: ORTHOPEDICS | Facility: CLINIC | Age: 25
End: 2020-03-20
Payer: COMMERCIAL

## 2020-03-20 VITALS
HEART RATE: 74 BPM | WEIGHT: 146 LBS | SYSTOLIC BLOOD PRESSURE: 118 MMHG | BODY MASS INDEX: 19.77 KG/M2 | DIASTOLIC BLOOD PRESSURE: 59 MMHG | HEIGHT: 72 IN

## 2020-03-20 DIAGNOSIS — S62.304A CLOSED NONDISPLACED FRACTURE OF FOURTH METACARPAL BONE OF RIGHT HAND, UNSPECIFIED PORTION OF METACARPAL, INITIAL ENCOUNTER: Primary | ICD-10-CM

## 2020-03-20 DIAGNOSIS — M79.641 RIGHT HAND PAIN: ICD-10-CM

## 2020-03-20 DIAGNOSIS — M79.641 RIGHT HAND PAIN: Primary | ICD-10-CM

## 2020-03-20 PROCEDURE — 99999 PR PBB SHADOW E&M-EST. PATIENT-LVL III: ICD-10-PCS | Mod: PBBFAC,,,

## 2020-03-20 PROCEDURE — 73130 X-RAY EXAM OF HAND: CPT | Mod: TC,RT

## 2020-03-20 PROCEDURE — 73130 X-RAY EXAM OF HAND: CPT | Mod: 26,RT,, | Performed by: RADIOLOGY

## 2020-03-20 PROCEDURE — 73130 XR HAND COMPLETE 3 VIEW RIGHT: ICD-10-PCS | Mod: 26,RT,, | Performed by: RADIOLOGY

## 2020-03-20 PROCEDURE — 99999 PR PBB SHADOW E&M-EST. PATIENT-LVL III: CPT | Mod: PBBFAC,,,

## 2020-03-20 NOTE — PROGRESS NOTES
Post-Op Progress Note    Subjective:    Oswaldo Prakash Jr. is a 24 y.o.  male s/p ORIF of right 4th and 5th metacarpals 4 weeks ago with Dr. Garcia. Initial injury occurred when patient punched a bowling ball dispenser. He reports no pain today. He has remained faithful to his WB status. For the most part he has remained in his ulnar gutter splint      Objective:  NVI  Incision is well approximated. No signs of infection  ROM: limited flexion of DIP, PIP, MCP of 4th and 5th digits, extension full   strength: 4/5  small area of diminished light touch sensation at the middle phalanx of the 5th digit.      Imaging: Xray of the right hand shows fracture alignment of the 4th and 5th metacarpals maintained. No callus formation seen. Hardware without complication or failure  -please see radiologist dictation for complete report      Assessment/Plan:    Encounter Diagnosis   Name Primary?    Closed nondisplaced fracture of fourth metacarpal bone of right hand, unspecified portion of metacarpal, initial encounter Yes     Patient's fracture alignments have maintained previous positioning. No hardware issues. He will transition out of his ulnar gutter splint. Limit WB to less than 10lbs. No ROM restrictions. He may perform pushing exercises as pain deems appropriate    Follow-up: 4 weeks with xrays      -Discussed findings with patient. Patient expressed understanding. Patient was given the opportunity to ask questions and be an active participant in their medical care. Patient had no further questions or concerns at this time.     Disclaimer: This note was generated using a voice recognition system and may have sound alike errors within the text.

## 2020-04-13 ENCOUNTER — TELEPHONE (OUTPATIENT)
Dept: ORTHOPEDICS | Facility: HOSPITAL | Age: 25
End: 2020-04-13

## 2020-04-13 NOTE — TELEPHONE ENCOUNTER
Called patient regarding her appointment scheduled for Wednesday 04/15/2020.  Patient would be roughly 2 months status post ORIF 4th and 5th metacarpals Frank right hand.  Patient was nontender palpation last visit.  Appeared stable.  She patient if doing well does not need to come in for his appointment and would follow up as needed.  Left voicemail    PERRY MerinoC  Orthopedic Surgery

## 2020-04-15 ENCOUNTER — HOSPITAL ENCOUNTER (OUTPATIENT)
Dept: RADIOLOGY | Facility: HOSPITAL | Age: 25
Discharge: HOME | End: 2020-04-15
Attending: ORTHOPAEDIC SURGERY
Payer: COMMERCIAL

## 2020-04-15 ENCOUNTER — OFFICE VISIT (OUTPATIENT)
Dept: ORTHOPEDICS | Facility: CLINIC | Age: 25
End: 2020-04-15
Payer: COMMERCIAL

## 2020-04-15 VITALS
HEIGHT: 72 IN | BODY MASS INDEX: 19.77 KG/M2 | WEIGHT: 146 LBS | SYSTOLIC BLOOD PRESSURE: 114 MMHG | HEART RATE: 61 BPM | DIASTOLIC BLOOD PRESSURE: 66 MMHG

## 2020-04-15 DIAGNOSIS — S62.304D CLOSED NONDISPLACED FRACTURE OF FOURTH METACARPAL BONE OF RIGHT HAND WITH ROUTINE HEALING, UNSPECIFIED PORTION OF METACARPAL, SUBSEQUENT ENCOUNTER: Primary | ICD-10-CM

## 2020-04-15 DIAGNOSIS — M79.641 RIGHT HAND PAIN: ICD-10-CM

## 2020-04-15 PROCEDURE — 99999 PR PBB SHADOW E&M-EST. PATIENT-LVL III: ICD-10-PCS | Mod: PBBFAC,,,

## 2020-04-15 PROCEDURE — 73130 XR HAND COMPLETE 3 VIEW RIGHT: ICD-10-PCS | Mod: 26,RT,, | Performed by: RADIOLOGY

## 2020-04-15 PROCEDURE — 73130 X-RAY EXAM OF HAND: CPT | Mod: 26,RT,, | Performed by: RADIOLOGY

## 2020-04-15 PROCEDURE — 73130 X-RAY EXAM OF HAND: CPT | Mod: TC,RT

## 2020-04-15 PROCEDURE — 99999 PR PBB SHADOW E&M-EST. PATIENT-LVL III: CPT | Mod: PBBFAC,,,

## 2020-04-15 NOTE — PROGRESS NOTES
Post-Op Progress Note    Subjective:    Oswaldo Prakash Jr. is a 24 y.o.  male s/p ORIF left 4th and 5th metacarpal 8 weeks ago with Dr. Garcia. Patient states he has been doing well and is pain.      Objective:  NVI  Incision well healed and has scarred  Flexion of the 4th MCP limited to 30 degrees, PIP and DIP normal ROM  Flexion of the 5th MCP limited to 15 degrees, PIP and Dip normal ROM      Imaging: Xray of the right hand shows hardware of the 4th and 5th metacarpals with complication or breakage. Fracture lines blurred  -please see radiologist dictation for complete report      Assessment/Plan:    Encounter Diagnosis   Name Primary?    Closed nondisplaced fracture of fourth metacarpal bone of right hand with routine healing, unspecified portion of metacarpal, subsequent encounter Yes     Patient has healed well after his ORIF of the 4th and 5th. ROM of his 4th and 5th MCP remains limted. He will be referred to OT to optimize his ROM as he his right handed.     Follow-up: PRN      -Discussed findings with patient. Patient expressed understanding. Patient was given the opportunity to ask questions and be an active participant in their medical care. Patient had no further questions or concerns at this time.     Disclaimer: This note was generated using a voice recognition system and may have sound alike errors within the text.

## 2020-12-26 ENCOUNTER — HOSPITAL ENCOUNTER (EMERGENCY)
Facility: HOSPITAL | Age: 25
Discharge: HOME | End: 2020-12-26
Attending: EMERGENCY MEDICINE
Payer: COMMERCIAL

## 2020-12-26 VITALS
HEIGHT: 72 IN | OXYGEN SATURATION: 98 % | HEART RATE: 61 BPM | TEMPERATURE: 98 F | RESPIRATION RATE: 18 BRPM | BODY MASS INDEX: 19.44 KG/M2 | DIASTOLIC BLOOD PRESSURE: 52 MMHG | SYSTOLIC BLOOD PRESSURE: 106 MMHG | WEIGHT: 143.5 LBS

## 2020-12-26 DIAGNOSIS — K08.89 PAIN, DENTAL: Primary | ICD-10-CM

## 2020-12-26 PROCEDURE — 99284 EMERGENCY DEPT VISIT MOD MDM: CPT | Mod: 25

## 2020-12-26 PROCEDURE — 63600175 PHARM REV CODE 636 W HCPCS: Performed by: NURSE PRACTITIONER

## 2020-12-26 PROCEDURE — 96372 THER/PROPH/DIAG INJ SC/IM: CPT

## 2020-12-26 RX ORDER — CHLORHEXIDINE GLUCONATE ORAL RINSE 1.2 MG/ML
15 SOLUTION DENTAL 2 TIMES DAILY
Qty: 420 ML | Refills: 0 | Status: SHIPPED | OUTPATIENT
Start: 2020-12-26 | End: 2021-01-09

## 2020-12-26 RX ORDER — CLINDAMYCIN PALMITATE HYDROCHLORIDE (PEDIATRIC) 75 MG/5ML
150 SOLUTION ORAL EVERY 6 HOURS
Qty: 280 ML | Refills: 0 | Status: SHIPPED | OUTPATIENT
Start: 2020-12-26 | End: 2021-01-02

## 2020-12-26 RX ORDER — DEXAMETHASONE SODIUM PHOSPHATE 4 MG/ML
8 INJECTION, SOLUTION INTRA-ARTICULAR; INTRALESIONAL; INTRAMUSCULAR; INTRAVENOUS; SOFT TISSUE
Status: COMPLETED | OUTPATIENT
Start: 2020-12-26 | End: 2020-12-26

## 2020-12-26 RX ADMIN — DEXAMETHASONE SODIUM PHOSPHATE 8 MG: 4 INJECTION INTRA-ARTICULAR; INTRALESIONAL; INTRAMUSCULAR; INTRAVENOUS; SOFT TISSUE at 09:12

## 2020-12-27 NOTE — DISCHARGE INSTRUCTIONS
Return to the ED immediately for any worsening of symptoms, fever greater than 100.4,  Pus draining from the tooth socket, increase facial or mouth swelling, swelling extending into neck, drainage from tooth or socket, Bleeding that is severe or wont stop with pressure on the area, dizziness, weakness or any concerns.     Take tylenol or motrin for any pain. Follow label directions.

## 2020-12-27 NOTE — ED PROVIDER NOTES
History      Chief Complaint   Patient presents with    Dental Pain     left sided dental pain that shoots up mouth that started yesterday       Review of patient's allergies indicates:   Allergen Reactions    Guaifenesin Hives and Shortness Of Breath    Guaifenesin-sodium citrate         HPI   HPI    12/26/2020, 9:31 PM   History obtained from the patient      History of Present Illness: Oswaldo Prakash Jr. is a 25 y.o. male patient who presents to the Emergency Department for dental pain x 1 day. The pt reports left upper molar pain, and denies any drainage from gums, fever, n/v, sore throat, ear pain, headaches, and facial swelling.  Symptoms are constant and moderate in severity. No further complaints or concerns at this time.           PCP: Thom Jara MD       Past Medical History:  Past Medical History:   Diagnosis Date    Allergy          Past Surgical History:  Past Surgical History:   Procedure Laterality Date    OPEN REDUCTION AND INTERNAL FIXATION (ORIF) OF INJURY OF HAND Right 2/21/2020    Procedure: ORIF, HAND;  Surgeon: Rafiq Garcia MD;  Location: [facility];  Service: Orthopedics;  Laterality: Right;           Family History:  Family History   Problem Relation Age of Onset    No Known Problems Mother     No Known Problems Father     Melanoma Neg Hx     Psoriasis Neg Hx     Lupus Neg Hx     Eczema Neg Hx            Social History:  Social History     Tobacco Use    Smoking status: Never Smoker    Smokeless tobacco: Never Used   Substance and Sexual Activity    Alcohol use: No    Drug use: Yes     Types: Marijuana    Sexual activity: Never       ROS   Constitutional: Negative for chills and fever.   HENT: Positive for dental problem. Negative for sore throat.    Respiratory: Negative for chest tightness and shortness of breath.    Cardiovascular: Negative for chest pain.   Gastrointestinal: Negative for nausea and vomiting.   Genitourinary: Negative for dysuria.    Musculoskeletal: Negative for back pain.   Skin: Negative for pallor and rash.   Neurological: Negative for weakness.   Hematological: Does not bruise/bleed easily.   All other systems reviewed and are negative.  Review of Systems    Physical Exam      Initial Vitals [12/26/20 2130]   BP Pulse Resp Temp SpO2   (!) 106/52 61 18 98.1 °F (36.7 °C) 98 %      MAP       --         Physical Exam  Vital signs and nursing notes reviewed.  Constitutional: Patient is in NAD. Awake and alert. Well-developed and well-nourished.  Head: Atraumatic. Normocephalic.  Eyes: PERRL. EOM intact. Conjunctivae nl. No scleral icterus.  ENT: Mucous membranes are moist. Oropharynx is clear. Left upper molar with pain and gingival ttp with mild erythema, and no drainage noted.  No facial edema  Neck: Supple. No JVD. No lymphadenopathy.  No meningismus  Cardiovascular: Regular rate and rhythm. No murmurs, rubs, or gallops. Distal pulses are 2+ and symmetric.  Pulmonary/Chest: No respiratory distress. Clear to auscultation bilaterally. No wheezing, rales, or rhonchi.  Abdominal: Soft. Non-distended. No TTP. No rebound, guarding, or rigidity. Good bowel sounds.  Genitourinary: No CVA tenderness  Musculoskeletal: Moves all extremities. No edema.   Skin: Warm and dry.  Neurological: Awake and alert. No acute focal neurological deficits are appreciated.  Psychiatric: Normal affect. Good eye contact. Appropriate in content.      ED Course      Procedures  ED Vital Signs:  Vitals:    12/26/20 2130   BP: (!) 106/52   Pulse: 61   Resp: 18   Temp: 98.1 °F (36.7 °C)   TempSrc: Oral   SpO2: 98%   Weight: 65.1 kg (143 lb 8.3 oz)   Height: 6' (1.829 m)                 Imaging Results:  Imaging Results    None            The Emergency Provider reviewed the vital signs and test results, which are outlined above.    ED Discussion     9:31 PM: All findings were reviewed with the patient/family in detail.  Findings seem to be most consistent with a diagnosis of  dental pain and dental caries.  GAVE PT LIST OF DENTAL RESOURCES.  DISCUSSED POTENTIAL COMPLICATIONS IF PT DOES NOT FOLLOW UP WITH DENTIST.  All remaining questions and concerns were addressed at that time.  Patient/family has been counseled regarding the need for follow-up as well as the indication to return to the emergency room should new or worrisome developments occur.        Medication(s) given in the ER:  Medications   dexamethasone injection 8 mg (8 mg Intramuscular Given 12/26/20 2163)           Follow-up Information     Thom Jara MD.    Specialty: Internal Medicine  Contact information:  [address]  [facility] [address]  [phone]             [facility]. Call in 1 week.    Why: Follow up with [facility] or dentist of your choice for futher evaluation of dental pain.  Contact information:  [address]  [facility] [id number] [phone]                       Discharge Medication List as of 12/26/2020  9:55 PM      START taking these medications    Details   chlorhexidine (PERIDEX) 0.12 % solution Use as directed 15 mLs in the mouth or throat 2 (two) times daily. for 14 days, Starting Sat 12/26/2020, Until Sat 1/9/2021, Normal      clindamycin (CLEOCIN) 75 mg/5 mL SolR Take 10 mLs (150 mg total) by mouth every 6 (six) hours. for 7 days, Starting Sat 12/26/2020, Until Sat 1/2/2021, Normal                Medical Decision Making              MDM               Clinical Impression:        ICD-10-CM ICD-9-CM   1. Pain, dental  K08.89 525.9              Disposition  Stable  Discharged       Oralia Reed NP  12/27/20 8948

## 2020-12-27 NOTE — ED NOTES
Patient identifiers verified and correct for Oswaldo Prakash JrCrista.    +Dental pain- Patient reports left sided dental pain starting on yesterday. Patient rates pain 7/10 at this time.     LOC: The patient is awake, alert and aware of environment with an appropriate affect, the patient is oriented x 3 and speaking appropriately.  APPEARANCE: Patient resting comfortably and in no acute distress, patient is clean and well groomed, patient's clothing is properly fastened.  SKIN: The skin is warm and dry, color consistent with ethnicity, patient has normal skin turgor and moist mucus membranes, skin intact, no breakdown or bruising noted.  MUSCULOSKELETAL: Patient moving all extremities spontaneously.  RESPIRATORY: Airway is open and patent, respirations are spontaneous.  CARDIAC: Patient has a normal rate, no periphreal edema noted, capillary refill < 3 seconds.  ABDOMEN: Soft and non tender to palpation.

## 2021-04-30 ENCOUNTER — HOSPITAL ENCOUNTER (EMERGENCY)
Facility: HOSPITAL | Age: 26
Discharge: HOME | End: 2021-05-01
Attending: EMERGENCY MEDICINE
Payer: COMMERCIAL

## 2021-04-30 DIAGNOSIS — J01.00 ACUTE NON-RECURRENT MAXILLARY SINUSITIS: Primary | ICD-10-CM

## 2021-04-30 PROCEDURE — 96372 THER/PROPH/DIAG INJ SC/IM: CPT

## 2021-04-30 PROCEDURE — 99284 EMERGENCY DEPT VISIT MOD MDM: CPT | Mod: 25

## 2021-04-30 RX ORDER — FLUTICASONE PROPIONATE 50 MCG
1 SPRAY, SUSPENSION (ML) NASAL 2 TIMES DAILY PRN
Qty: 15 G | Refills: 0 | Status: SHIPPED | OUTPATIENT
Start: 2021-04-30

## 2021-04-30 RX ORDER — DEXAMETHASONE SODIUM PHOSPHATE 4 MG/ML
10 INJECTION, SOLUTION INTRA-ARTICULAR; INTRALESIONAL; INTRAMUSCULAR; INTRAVENOUS; SOFT TISSUE
Status: COMPLETED | OUTPATIENT
Start: 2021-04-30 | End: 2021-05-01

## 2021-04-30 RX ORDER — TRIPROLIDINE/PSEUDOEPHEDRINE 2.5MG-60MG
600 TABLET ORAL EVERY 6 HOURS PRN
Qty: 300 ML | Refills: 0 | Status: SHIPPED | OUTPATIENT
Start: 2021-04-30

## 2021-05-01 VITALS
BODY MASS INDEX: 19.94 KG/M2 | WEIGHT: 147.06 LBS | RESPIRATION RATE: 16 BRPM | TEMPERATURE: 99 F | HEART RATE: 62 BPM | SYSTOLIC BLOOD PRESSURE: 106 MMHG | DIASTOLIC BLOOD PRESSURE: 59 MMHG | OXYGEN SATURATION: 99 %

## 2021-05-01 PROCEDURE — 63600175 PHARM REV CODE 636 W HCPCS: Performed by: NURSE PRACTITIONER

## 2021-05-01 RX ADMIN — DEXAMETHASONE SODIUM PHOSPHATE 10 MG: 4 INJECTION INTRA-ARTICULAR; INTRALESIONAL; INTRAMUSCULAR; INTRAVENOUS; SOFT TISSUE at 12:05

## 2021-05-02 ENCOUNTER — HOSPITAL ENCOUNTER (EMERGENCY)
Facility: HOSPITAL | Age: 26
Discharge: HOME | End: 2021-05-02
Attending: EMERGENCY MEDICINE
Payer: COMMERCIAL

## 2021-05-02 VITALS
TEMPERATURE: 99 F | HEART RATE: 55 BPM | OXYGEN SATURATION: 99 % | DIASTOLIC BLOOD PRESSURE: 71 MMHG | WEIGHT: 148.06 LBS | BODY MASS INDEX: 20.05 KG/M2 | RESPIRATION RATE: 18 BRPM | SYSTOLIC BLOOD PRESSURE: 117 MMHG | HEIGHT: 72 IN

## 2021-05-02 DIAGNOSIS — K08.89 PAIN, DENTAL: Primary | ICD-10-CM

## 2021-05-02 PROCEDURE — 99283 EMERGENCY DEPT VISIT LOW MDM: CPT

## 2021-05-02 RX ORDER — AMOXICILLIN AND CLAVULANATE POTASSIUM 400; 57 MG/5ML; MG/5ML
876 POWDER, FOR SUSPENSION ORAL 2 TIMES DAILY
Qty: 154 ML | Refills: 0 | Status: SHIPPED | OUTPATIENT
Start: 2021-05-02 | End: 2021-05-09

## 2021-05-26 ENCOUNTER — HOSPITAL ENCOUNTER (EMERGENCY)
Facility: HOSPITAL | Age: 26
Discharge: HOME | End: 2021-05-26
Attending: FAMILY MEDICINE
Payer: COMMERCIAL

## 2021-05-26 VITALS
DIASTOLIC BLOOD PRESSURE: 70 MMHG | HEIGHT: 72 IN | SYSTOLIC BLOOD PRESSURE: 102 MMHG | RESPIRATION RATE: 18 BRPM | OXYGEN SATURATION: 99 % | WEIGHT: 142.06 LBS | TEMPERATURE: 98 F | HEART RATE: 62 BPM | BODY MASS INDEX: 19.24 KG/M2

## 2021-05-26 DIAGNOSIS — B34.9 VIRAL SYNDROME: Primary | ICD-10-CM

## 2021-05-26 LAB
CTP QC/QA: YES
INFLUENZA A, MOLECULAR: NEGATIVE
INFLUENZA B, MOLECULAR: NEGATIVE
SARS-COV-2 RDRP RESP QL NAA+PROBE: NEGATIVE
SPECIMEN SOURCE: NORMAL

## 2021-05-26 PROCEDURE — U0002 COVID-19 LAB TEST NON-CDC: HCPCS | Performed by: FAMILY MEDICINE

## 2021-05-26 PROCEDURE — 87502 INFLUENZA DNA AMP PROBE: CPT | Performed by: FAMILY MEDICINE

## 2021-05-26 PROCEDURE — 99282 EMERGENCY DEPT VISIT SF MDM: CPT

## 2021-05-26 RX ORDER — PROMETHAZINE HYDROCHLORIDE AND DEXTROMETHORPHAN HYDROBROMIDE 6.25; 15 MG/5ML; MG/5ML
5 SYRUP ORAL EVERY 6 HOURS PRN
COMMUNITY
Start: 2021-04-12

## 2022-03-11 ENCOUNTER — HOSPITAL ENCOUNTER (EMERGENCY)
Facility: HOSPITAL | Age: 27
Discharge: HOME | End: 2022-03-11
Attending: EMERGENCY MEDICINE
Payer: COMMERCIAL

## 2022-03-11 VITALS
TEMPERATURE: 99 F | WEIGHT: 147.69 LBS | OXYGEN SATURATION: 100 % | SYSTOLIC BLOOD PRESSURE: 118 MMHG | HEART RATE: 55 BPM | RESPIRATION RATE: 18 BRPM | BODY MASS INDEX: 20.03 KG/M2 | DIASTOLIC BLOOD PRESSURE: 68 MMHG

## 2022-03-11 DIAGNOSIS — G47.00 INSOMNIA, UNSPECIFIED TYPE: ICD-10-CM

## 2022-03-11 DIAGNOSIS — R53.83 FATIGUE, UNSPECIFIED TYPE: ICD-10-CM

## 2022-03-11 DIAGNOSIS — R11.0 NAUSEA: ICD-10-CM

## 2022-03-11 DIAGNOSIS — T88.7XXA MEDICATION SIDE EFFECT: Primary | ICD-10-CM

## 2022-03-11 LAB
ALBUMIN SERPL BCP-MCNC: 4.4 G/DL (ref 3.5–5.2)
ALP SERPL-CCNC: 53 U/L (ref 55–135)
ALT SERPL W/O P-5'-P-CCNC: 23 U/L (ref 10–44)
ANION GAP SERPL CALC-SCNC: 8 MMOL/L (ref 8–16)
AST SERPL-CCNC: 25 U/L (ref 10–40)
BASOPHILS # BLD AUTO: 0.04 K/UL (ref 0–0.2)
BASOPHILS NFR BLD: 1.4 % (ref 0–1.9)
BILIRUB SERPL-MCNC: 0.9 MG/DL (ref 0.1–1)
BILIRUB UR QL STRIP: NEGATIVE
BUN SERPL-MCNC: 10 MG/DL (ref 6–20)
CALCIUM SERPL-MCNC: 9.5 MG/DL (ref 8.7–10.5)
CHLORIDE SERPL-SCNC: 106 MMOL/L (ref 95–110)
CLARITY UR: CLEAR
CO2 SERPL-SCNC: 25 MMOL/L (ref 23–29)
COLOR UR: YELLOW
CREAT SERPL-MCNC: 1.2 MG/DL (ref 0.5–1.4)
DIFFERENTIAL METHOD: ABNORMAL
EOSINOPHIL # BLD AUTO: 0.1 K/UL (ref 0–0.5)
EOSINOPHIL NFR BLD: 4.2 % (ref 0–8)
ERYTHROCYTE [DISTWIDTH] IN BLOOD BY AUTOMATED COUNT: 13.3 % (ref 11.5–14.5)
EST. GFR  (AFRICAN AMERICAN): >60 ML/MIN/1.73 M^2
EST. GFR  (NON AFRICAN AMERICAN): >60 ML/MIN/1.73 M^2
GLUCOSE SERPL-MCNC: 80 MG/DL (ref 70–110)
GLUCOSE UR QL STRIP: NEGATIVE
HCT VFR BLD AUTO: 41.9 % (ref 40–54)
HGB BLD-MCNC: 13.3 G/DL (ref 14–18)
HGB UR QL STRIP: NEGATIVE
IMM GRANULOCYTES # BLD AUTO: 0 K/UL (ref 0–0.04)
IMM GRANULOCYTES NFR BLD AUTO: 0 % (ref 0–0.5)
KETONES UR QL STRIP: NEGATIVE
LEUKOCYTE ESTERASE UR QL STRIP: NEGATIVE
LYMPHOCYTES # BLD AUTO: 1.2 K/UL (ref 1–4.8)
LYMPHOCYTES NFR BLD: 42.6 % (ref 18–48)
MCH RBC QN AUTO: 26.7 PG (ref 27–31)
MCHC RBC AUTO-ENTMCNC: 31.7 G/DL (ref 32–36)
MCV RBC AUTO: 84 FL (ref 82–98)
MONOCYTES # BLD AUTO: 0.2 K/UL (ref 0.3–1)
MONOCYTES NFR BLD: 8.3 % (ref 4–15)
NEUTROPHILS # BLD AUTO: 1.3 K/UL (ref 1.8–7.7)
NEUTROPHILS NFR BLD: 43.5 % (ref 38–73)
NITRITE UR QL STRIP: NEGATIVE
NRBC BLD-RTO: 0 /100 WBC
PH UR STRIP: 8 [PH] (ref 5–8)
PLATELET # BLD AUTO: 209 K/UL (ref 150–450)
PMV BLD AUTO: 9.6 FL (ref 9.2–12.9)
POCT GLUCOSE: 71 MG/DL (ref 70–110)
POTASSIUM SERPL-SCNC: 4.4 MMOL/L (ref 3.5–5.1)
PROT SERPL-MCNC: 7 G/DL (ref 6–8.4)
PROT UR QL STRIP: NEGATIVE
RBC # BLD AUTO: 4.99 M/UL (ref 4.6–6.2)
SODIUM SERPL-SCNC: 139 MMOL/L (ref 136–145)
SP GR UR STRIP: 1.02 (ref 1–1.03)
URN SPEC COLLECT METH UR: NORMAL
UROBILINOGEN UR STRIP-ACNC: 1 EU/DL
WBC # BLD AUTO: 2.89 K/UL (ref 3.9–12.7)

## 2022-03-11 PROCEDURE — 25000003 PHARM REV CODE 250: Performed by: NURSE PRACTITIONER

## 2022-03-11 PROCEDURE — 82962 GLUCOSE BLOOD TEST: CPT

## 2022-03-11 PROCEDURE — 80053 COMPREHEN METABOLIC PANEL: CPT | Performed by: NURSE PRACTITIONER

## 2022-03-11 PROCEDURE — 99284 EMERGENCY DEPT VISIT MOD MDM: CPT | Mod: 25

## 2022-03-11 PROCEDURE — 96361 HYDRATE IV INFUSION ADD-ON: CPT

## 2022-03-11 PROCEDURE — 85025 COMPLETE CBC W/AUTO DIFF WBC: CPT | Performed by: NURSE PRACTITIONER

## 2022-03-11 PROCEDURE — 63600175 PHARM REV CODE 636 W HCPCS: Performed by: NURSE PRACTITIONER

## 2022-03-11 PROCEDURE — 96374 THER/PROPH/DIAG INJ IV PUSH: CPT

## 2022-03-11 PROCEDURE — 81003 URINALYSIS AUTO W/O SCOPE: CPT | Performed by: NURSE PRACTITIONER

## 2022-03-11 RX ORDER — ONDANSETRON 4 MG/1
4 TABLET, FILM COATED ORAL EVERY 6 HOURS PRN
Qty: 20 TABLET | Refills: 0 | Status: SHIPPED | OUTPATIENT
Start: 2022-03-11

## 2022-03-11 RX ORDER — ONDANSETRON 2 MG/ML
4 INJECTION INTRAMUSCULAR; INTRAVENOUS
Status: COMPLETED | OUTPATIENT
Start: 2022-03-11 | End: 2022-03-11

## 2022-03-11 RX ADMIN — SODIUM CHLORIDE 1000 ML: 0.9 INJECTION, SOLUTION INTRAVENOUS at 08:03

## 2022-03-11 RX ADMIN — ONDANSETRON 4 MG: 2 INJECTION INTRAMUSCULAR; INTRAVENOUS at 08:03

## 2022-03-11 NOTE — ED NOTES
Pt AAOx4, resting in recliner chair with wheels locked, side rails up x 2, Pt in NAD at this time and resting comfortably. Will continue to monitor.

## 2022-03-11 NOTE — ED PROVIDER NOTES
HISTORY     Chief Complaint   Patient presents with    Anxiety     Seen Monday for anxiety, depression. Got vistaril and antidepressant (lexapro). Pt now c/o decreased appetite and loss of sleep/insomnia.      Review of patient's allergies indicates:   Allergen Reactions    Guaifenesin Hives and Shortness Of Breath    Guaifenesin-sodium citrate         HPI   26-year-old male presents to the emergency department for fatigue, nausea and insomnia.  Patient reports her this week he was started on Vistaril and Lexapro for anxiety and depression.  Patient reports that the symptoms have improved however he is now feeling fatigued, nauseated, dehydrated, and having decreased sleep.  Patient denies any chest pain, shortness of breath, fevers, abdominal pain, back pain, and all other concerns at this time.    The history is provided by the patient. No  was used.        PCP: Thom Jara MD     Past Medical History:  Past Medical History:   Diagnosis Date    Allergy         Past Surgical History:  Past Surgical History:   Procedure Laterality Date    OPEN REDUCTION AND INTERNAL FIXATION (ORIF) OF INJURY OF HAND Right 2/21/2020    Procedure: ORIF, HAND;  Surgeon: Rafiq Garcia MD;  Location: [facility];  Service: Orthopedics;  Laterality: Right;        Family History:  Family History   Problem Relation Age of Onset    No Known Problems Mother     No Known Problems Father     Melanoma Neg Hx     Psoriasis Neg Hx     Lupus Neg Hx     Eczema Neg Hx         Social History:  Social History     Tobacco Use    Smoking status: Never Smoker    Smokeless tobacco: Never Used   Substance and Sexual Activity    Alcohol use: No    Drug use: Yes     Types: Marijuana    Sexual activity: Never         ROS   Review of Systems   Constitutional: Positive for fatigue. Negative for fever.   HENT: Negative for sore throat.    Respiratory: Negative for shortness of breath.    Cardiovascular: Negative for chest  pain.   Gastrointestinal: Positive for nausea.   Genitourinary: Negative for dysuria.   Musculoskeletal: Negative for back pain.   Skin: Negative for rash.   Neurological: Negative for weakness.   Hematological: Does not bruise/bleed easily.   Psychiatric/Behavioral:        +insomnia       PHYSICAL EXAM     Initial Vitals [03/11/22 0800]   BP Pulse Resp Temp SpO2   114/71 (!) 53 16 98.4 °F (36.9 °C) 97 %      MAP       --           Physical Exam    Nursing note and vitals reviewed.  Constitutional: He appears well-developed and well-nourished. He is not diaphoretic. No distress.   HENT:   Head: Normocephalic and atraumatic.   Eyes: Right eye exhibits no discharge. Left eye exhibits no discharge.   Neck: Neck supple.   Normal range of motion.  Cardiovascular:   Bradycardic   Pulmonary/Chest: No respiratory distress.   Abdominal: Abdomen is soft. He exhibits no distension. There is no abdominal tenderness.   Musculoskeletal:         General: Normal range of motion.      Cervical back: Normal range of motion and neck supple.     Neurological: He is alert and oriented to person, place, and time. He has normal strength.   Skin: Skin is warm and dry.   Psychiatric: He has a normal mood and affect. His behavior is normal. Thought content normal.          ED COURSE   Procedures  ED ONGOING VITALS:  Vitals:    03/11/22 0800 03/11/22 1000   BP: 114/71 118/68   Pulse: (!) 53 (!) 55   Resp: 16 18   Temp: 98.4 °F (36.9 °C) 98.7 °F (37.1 °C)   TempSrc: Oral Oral   SpO2: 97% 100%   Weight: 67 kg (147 lb 11.3 oz)          ABNORMAL LAB VALUES:  Labs Reviewed   CBC W/ AUTO DIFFERENTIAL - Abnormal; Notable for the following components:       Result Value    WBC 2.89 (*)     Hemoglobin 13.3 (*)     MCH 26.7 (*)     MCHC 31.7 (*)     Gran # (ANC) 1.3 (*)     Mono # 0.2 (*)     All other components within normal limits   COMPREHENSIVE METABOLIC PANEL - Abnormal; Notable for the following components:    Alkaline Phosphatase 53 (*)     All  other components within normal limits   URINALYSIS, REFLEX TO URINE CULTURE    Narrative:     Specimen Source->Urine   POCT GLUCOSE   POCT GLUCOSE MONITORING CONTINUOUS         ALL LAB VALUES:  Results for orders placed or performed during the hospital encounter of 03/11/22   Urinalysis, Reflex to Urine Culture Urine, Clean Catch    Specimen: Urine   Result Value Ref Range    Specimen UA Urine, Clean Catch     Color, UA Yellow Yellow, Straw, Nicole    Appearance, UA Clear Clear    pH, UA 8.0 5.0 - 8.0    Specific Gravity, UA 1.020 1.005 - 1.030    Protein, UA Negative Negative    Glucose, UA Negative Negative    Ketones, UA Negative Negative    Bilirubin (UA) Negative Negative    Occult Blood UA Negative Negative    Nitrite, UA Negative Negative    Urobilinogen, UA 1.0 <2.0 EU/dL    Leukocytes, UA Negative Negative   CBC auto differential   Result Value Ref Range    WBC 2.89 (L) 3.90 - 12.70 K/uL    RBC 4.99 4.60 - 6.20 M/uL    Hemoglobin 13.3 (L) 14.0 - 18.0 g/dL    Hematocrit 41.9 40.0 - 54.0 %    MCV 84 82 - 98 fL    MCH 26.7 (L) 27.0 - 31.0 pg    MCHC 31.7 (L) 32.0 - 36.0 g/dL    RDW 13.3 11.5 - 14.5 %    Platelets 209 150 - 450 K/uL    MPV 9.6 9.2 - 12.9 fL    Immature Granulocytes 0.0 0.0 - 0.5 %    Gran # (ANC) 1.3 (L) 1.8 - 7.7 K/uL    Immature Grans (Abs) 0.00 0.00 - 0.04 K/uL    Lymph # 1.2 1.0 - 4.8 K/uL    Mono # 0.2 (L) 0.3 - 1.0 K/uL    Eos # 0.1 0.0 - 0.5 K/uL    Baso # 0.04 0.00 - 0.20 K/uL    nRBC 0 0 /100 WBC    Gran % 43.5 38.0 - 73.0 %    Lymph % 42.6 18.0 - 48.0 %    Mono % 8.3 4.0 - 15.0 %    Eosinophil % 4.2 0.0 - 8.0 %    Basophil % 1.4 0.0 - 1.9 %    Differential Method Automated    Comprehensive metabolic panel   Result Value Ref Range    Sodium 139 136 - 145 mmol/L    Potassium 4.4 3.5 - 5.1 mmol/L    Chloride 106 95 - 110 mmol/L    CO2 25 23 - 29 mmol/L    Glucose 80 70 - 110 mg/dL    BUN 10 6 - 20 mg/dL    Creatinine 1.2 0.5 - 1.4 mg/dL    Calcium 9.5 8.7 - 10.5 mg/dL    Total Protein 7.0  6.0 - 8.4 g/dL    Albumin 4.4 3.5 - 5.2 g/dL    Total Bilirubin 0.9 0.1 - 1.0 mg/dL    Alkaline Phosphatase 53 (L) 55 - 135 U/L    AST 25 10 - 40 U/L    ALT 23 10 - 44 U/L    Anion Gap 8 8 - 16 mmol/L    eGFR if African American >60 >60 mL/min/1.73 m^2    eGFR if non African American >60 >60 mL/min/1.73 m^2   POCT glucose   Result Value Ref Range    POCT Glucose 71 70 - 110 mg/dL         RADIOLOGY STUDIES:  Imaging Results    None                   The above vital signs and test results have been reviewed by the emergency provider.     ED Medications:  Current Discharge Medication List        Discharge Medications:  Discharge Medication List as of 3/11/2022 10:10 AM      START taking these medications    Details   ondansetron (ZOFRAN) 4 MG tablet Take 1 tablet (4 mg total) by mouth every 6 (six) hours as needed., Starting Fri 3/11/2022, Print             Follow-up Information     Thom Jara MD.    Specialty: Internal Medicine  Why: As needed  Contact information:  [address] [place] [id number]  [phone]             [facility] - Emergency Dept..    Specialty: Emergency Medicine  Why: If symptoms worsen  Contact information:  [address] [place] [id number] [phone]                      10:10 AM    I discussed with patient and/or family/caretaker that evaluation in the ED does not suggest any emergent or life threatening medical conditions requiring immediate intervention beyond what was provided in the ED, and I believe patient is safe for discharge. Regardless, an unremarkable evaluation in the ED does not preclude the development or presence of a serious or life threatening condition. As such, patient was instructed to return immediately for any worsening or change in current symptoms.        MEDICAL DECISION MAKING                 CLINICAL IMPRESSION       ICD-10-CM ICD-9-CM   1. Medication side effect  T88.7XXA 995.20   2. Insomnia, unspecified type  G47.00 780.52    3. Fatigue, unspecified type  R53.83 780.79   4. Nausea  R11.0 787.02       Disposition:   Disposition: Discharged  Condition: Stable         Geoff Loera NP  03/11/22 1023

## 2022-03-11 NOTE — ED NOTES
Gave pt labeled specimen cup. Pt is unable to give sample at this time, but will notify nurse when able to give sample. Primary nurse notified.

## 2022-05-20 ENCOUNTER — HOSPITAL ENCOUNTER (EMERGENCY)
Facility: HOSPITAL | Age: 27
Discharge: HOME | End: 2022-05-20
Attending: EMERGENCY MEDICINE
Payer: COMMERCIAL

## 2022-05-20 VITALS
SYSTOLIC BLOOD PRESSURE: 130 MMHG | HEART RATE: 88 BPM | RESPIRATION RATE: 18 BRPM | DIASTOLIC BLOOD PRESSURE: 70 MMHG | WEIGHT: 140.13 LBS | OXYGEN SATURATION: 98 % | TEMPERATURE: 97 F | BODY MASS INDEX: 18.98 KG/M2 | HEIGHT: 72 IN

## 2022-05-20 DIAGNOSIS — F12.10 MILD TETRAHYDROCANNABINOL (THC) ABUSE: ICD-10-CM

## 2022-05-20 DIAGNOSIS — M79.10 MYALGIA: Primary | ICD-10-CM

## 2022-05-20 DIAGNOSIS — R11.2 NON-INTRACTABLE VOMITING WITH NAUSEA, UNSPECIFIED VOMITING TYPE: ICD-10-CM

## 2022-05-20 LAB
ALBUMIN SERPL BCP-MCNC: 4.8 G/DL (ref 3.5–5.2)
ALP SERPL-CCNC: 53 U/L (ref 55–135)
ALT SERPL W/O P-5'-P-CCNC: 16 U/L (ref 10–44)
AMPHET+METHAMPHET UR QL: NEGATIVE
ANION GAP SERPL CALC-SCNC: 13 MMOL/L (ref 8–16)
AST SERPL-CCNC: 21 U/L (ref 10–40)
BARBITURATES UR QL SCN>200 NG/ML: NEGATIVE
BASOPHILS # BLD AUTO: 0.04 K/UL (ref 0–0.2)
BASOPHILS NFR BLD: 0.9 % (ref 0–1.9)
BENZODIAZ UR QL SCN>200 NG/ML: NEGATIVE
BILIRUB SERPL-MCNC: 1 MG/DL (ref 0.1–1)
BILIRUB UR QL STRIP: NEGATIVE
BUN SERPL-MCNC: 13 MG/DL (ref 6–20)
BZE UR QL SCN: NEGATIVE
CALCIUM SERPL-MCNC: 9.7 MG/DL (ref 8.7–10.5)
CANNABINOIDS UR QL SCN: ABNORMAL
CHLORIDE SERPL-SCNC: 107 MMOL/L (ref 95–110)
CK SERPL-CCNC: 387 U/L (ref 20–200)
CLARITY UR: CLEAR
CO2 SERPL-SCNC: 22 MMOL/L (ref 23–29)
COLOR UR: YELLOW
CREAT SERPL-MCNC: 1.3 MG/DL (ref 0.5–1.4)
CREAT UR-MCNC: 244.5 MG/DL (ref 23–375)
DIFFERENTIAL METHOD: ABNORMAL
EOSINOPHIL # BLD AUTO: 0.1 K/UL (ref 0–0.5)
EOSINOPHIL NFR BLD: 3 % (ref 0–8)
ERYTHROCYTE [DISTWIDTH] IN BLOOD BY AUTOMATED COUNT: 13.2 % (ref 11.5–14.5)
EST. GFR  (AFRICAN AMERICAN): >60 ML/MIN/1.73 M^2
EST. GFR  (NON AFRICAN AMERICAN): >60 ML/MIN/1.73 M^2
GLUCOSE SERPL-MCNC: 85 MG/DL (ref 70–110)
GLUCOSE UR QL STRIP: NEGATIVE
HCT VFR BLD AUTO: 44.1 % (ref 40–54)
HGB BLD-MCNC: 14 G/DL (ref 14–18)
HGB UR QL STRIP: NEGATIVE
IMM GRANULOCYTES # BLD AUTO: 0.01 K/UL (ref 0–0.04)
IMM GRANULOCYTES NFR BLD AUTO: 0.2 % (ref 0–0.5)
INFLUENZA A, MOLECULAR: NEGATIVE
INFLUENZA B, MOLECULAR: NEGATIVE
KETONES UR QL STRIP: ABNORMAL
LEUKOCYTE ESTERASE UR QL STRIP: NEGATIVE
LIPASE SERPL-CCNC: 19 U/L (ref 4–60)
LYMPHOCYTES # BLD AUTO: 2.1 K/UL (ref 1–4.8)
LYMPHOCYTES NFR BLD: 47.3 % (ref 18–48)
MCH RBC QN AUTO: 26.5 PG (ref 27–31)
MCHC RBC AUTO-ENTMCNC: 31.7 G/DL (ref 32–36)
MCV RBC AUTO: 83 FL (ref 82–98)
METHADONE UR QL SCN>300 NG/ML: NEGATIVE
MONOCYTES # BLD AUTO: 0.4 K/UL (ref 0.3–1)
MONOCYTES NFR BLD: 9.7 % (ref 4–15)
NEUTROPHILS # BLD AUTO: 1.7 K/UL (ref 1.8–7.7)
NEUTROPHILS NFR BLD: 38.9 % (ref 38–73)
NITRITE UR QL STRIP: NEGATIVE
NRBC BLD-RTO: 0 /100 WBC
OPIATES UR QL SCN: NEGATIVE
PCP UR QL SCN>25 NG/ML: NEGATIVE
PH UR STRIP: 7 [PH] (ref 5–8)
PLATELET # BLD AUTO: 224 K/UL (ref 150–450)
PMV BLD AUTO: 9.5 FL (ref 9.2–12.9)
POTASSIUM SERPL-SCNC: 4.4 MMOL/L (ref 3.5–5.1)
PROT SERPL-MCNC: 7.7 G/DL (ref 6–8.4)
PROT UR QL STRIP: NEGATIVE
RBC # BLD AUTO: 5.29 M/UL (ref 4.6–6.2)
SARS-COV-2 RDRP RESP QL NAA+PROBE: NEGATIVE
SODIUM SERPL-SCNC: 142 MMOL/L (ref 136–145)
SP GR UR STRIP: 1.02 (ref 1–1.03)
SPECIMEN SOURCE: NORMAL
TOXICOLOGY INFORMATION: ABNORMAL
TROPONIN I SERPL DL<=0.01 NG/ML-MCNC: 0.02 NG/ML (ref 0–0.03)
URN SPEC COLLECT METH UR: ABNORMAL
UROBILINOGEN UR STRIP-ACNC: NEGATIVE EU/DL
WBC # BLD AUTO: 4.33 K/UL (ref 3.9–12.7)

## 2022-05-20 PROCEDURE — 85025 COMPLETE CBC W/AUTO DIFF WBC: CPT | Performed by: NURSE PRACTITIONER

## 2022-05-20 PROCEDURE — 81003 URINALYSIS AUTO W/O SCOPE: CPT | Mod: 59 | Performed by: NURSE PRACTITIONER

## 2022-05-20 PROCEDURE — 83690 ASSAY OF LIPASE: CPT | Performed by: NURSE PRACTITIONER

## 2022-05-20 PROCEDURE — 63600175 PHARM REV CODE 636 W HCPCS: Performed by: NURSE PRACTITIONER

## 2022-05-20 PROCEDURE — 25000003 PHARM REV CODE 250: Performed by: NURSE PRACTITIONER

## 2022-05-20 PROCEDURE — 87502 INFLUENZA DNA AMP PROBE: CPT | Performed by: NURSE PRACTITIONER

## 2022-05-20 PROCEDURE — 96361 HYDRATE IV INFUSION ADD-ON: CPT

## 2022-05-20 PROCEDURE — 96374 THER/PROPH/DIAG INJ IV PUSH: CPT

## 2022-05-20 PROCEDURE — U0002 COVID-19 LAB TEST NON-CDC: HCPCS | Performed by: NURSE PRACTITIONER

## 2022-05-20 PROCEDURE — 99284 EMERGENCY DEPT VISIT MOD MDM: CPT | Mod: 25

## 2022-05-20 PROCEDURE — 84484 ASSAY OF TROPONIN QUANT: CPT | Performed by: NURSE PRACTITIONER

## 2022-05-20 PROCEDURE — 80053 COMPREHEN METABOLIC PANEL: CPT | Performed by: NURSE PRACTITIONER

## 2022-05-20 PROCEDURE — 82550 ASSAY OF CK (CPK): CPT | Performed by: NURSE PRACTITIONER

## 2022-05-20 PROCEDURE — 80307 DRUG TEST PRSMV CHEM ANLYZR: CPT | Performed by: NURSE PRACTITIONER

## 2022-05-20 RX ORDER — PROMETHAZINE HYDROCHLORIDE 25 MG/1
25 TABLET ORAL EVERY 6 HOURS PRN
Qty: 15 TABLET | Refills: 0 | Status: SHIPPED | OUTPATIENT
Start: 2022-05-20

## 2022-05-20 RX ORDER — DICLOFENAC SODIUM 50 MG/1
50 TABLET, DELAYED RELEASE ORAL 3 TIMES DAILY PRN
Qty: 15 TABLET | Refills: 0 | Status: SHIPPED | OUTPATIENT
Start: 2022-05-20

## 2022-05-20 RX ORDER — KETOROLAC TROMETHAMINE 30 MG/ML
30 INJECTION, SOLUTION INTRAMUSCULAR; INTRAVENOUS
Status: COMPLETED | OUTPATIENT
Start: 2022-05-20 | End: 2022-05-20

## 2022-05-20 RX ADMIN — KETOROLAC TROMETHAMINE 30 MG: 30 INJECTION, SOLUTION INTRAMUSCULAR at 04:05

## 2022-05-20 RX ADMIN — SODIUM CHLORIDE 1000 ML: 0.9 INJECTION, SOLUTION INTRAVENOUS at 04:05

## 2022-05-20 NOTE — Clinical Note
"Oswaldo Cooper" Dwain was seen and treated in our emergency department on 5/20/2022.  He may return to work on 05/22/2022.       If you have any questions or concerns, please don't hesitate to call.      Terry Zavala NP"

## 2022-05-22 NOTE — ED PROVIDER NOTES
HISTORY     Chief Complaint   Patient presents with    General Illness     Chills, body aches, fever, and nasal congestion since Friday     Review of patient's allergies indicates:   Allergen Reactions    Guaifenesin Hives and Shortness Of Breath    Guaifenesin-sodium citrate         HPI   The history is provided by the patient.   General Illness   The current episode started today. The problem occurs rarely. The problem has been gradually worsening. Nothing relieves the symptoms. Nothing aggravates the symptoms. Associated symptoms include a fever, nausea, vomiting, congestion and muscle aches. Pertinent negatives include no sore throat, no shortness of breath and no rash. He has received no recent medical care.    Patient states that he has been working out in the heat a lot lately.    PCP: Thom Jara MD     Past Medical History:  Past Medical History:   Diagnosis Date    Allergy         Past Surgical History:  Past Surgical History:   Procedure Laterality Date    OPEN REDUCTION AND INTERNAL FIXATION (ORIF) OF INJURY OF HAND Right 2/21/2020    Procedure: ORIF, HAND;  Surgeon: Rafiq Garcia MD;  Location: [facility];  Service: Orthopedics;  Laterality: Right;        Family History:  Family History   Problem Relation Age of Onset    No Known Problems Mother     No Known Problems Father     Melanoma Neg Hx     Psoriasis Neg Hx     Lupus Neg Hx     Eczema Neg Hx         Social History:  Social History     Tobacco Use    Smoking status: Never Smoker    Smokeless tobacco: Never Used   Substance and Sexual Activity    Alcohol use: No    Drug use: Yes     Types: Marijuana    Sexual activity: Never         ROS   Review of Systems   Constitutional: Positive for chills and fever.   HENT: Positive for congestion. Negative for sore throat.    Respiratory: Negative for shortness of breath.    Cardiovascular: Positive for chest pain.   Gastrointestinal: Positive for nausea and vomiting.   Genitourinary:  Negative for dysuria.   Musculoskeletal: Positive for myalgias. Negative for back pain.   Skin: Negative for rash.   Neurological: Negative for weakness.   Hematological: Does not bruise/bleed easily.       PHYSICAL EXAM     Initial Vitals [05/20/22 1314]   BP Pulse Resp Temp SpO2   121/84 69 20 99.1 °F (37.3 °C) 98 %      MAP       --           Physical Exam    Constitutional: He appears well-developed and well-nourished. No distress.   HENT:   Head: Normocephalic and atraumatic.   Eyes: Conjunctivae are normal. Pupils are equal, round, and reactive to light.   Neck: Neck supple.   Normal range of motion.  Cardiovascular: Normal rate, regular rhythm and normal heart sounds.   Pulmonary/Chest: Breath sounds normal.   Abdominal: Abdomen is soft. Bowel sounds are normal.   Musculoskeletal:         General: Normal range of motion.      Cervical back: Normal range of motion and neck supple.     Neurological: He is alert and oriented to person, place, and time. No cranial nerve deficit.   Skin: Skin is warm and dry.   Psychiatric: He has a normal mood and affect.          ED COURSE   Procedures  ED ONGOING VITALS:  Vitals:    05/20/22 1314 05/20/22 1845   BP: 121/84 130/70   Pulse: 69 88   Resp: 20 18   Temp: 99.1 °F (37.3 °C) 97.2 °F (36.2 °C)   TempSrc: Oral Oral   SpO2: 98%    Weight: 63.6 kg (140 lb 1.6 oz)    Height: 6' (1.829 m)          ABNORMAL LAB VALUES:  Labs Reviewed   CBC W/ AUTO DIFFERENTIAL - Abnormal; Notable for the following components:       Result Value    MCH 26.5 (*)     MCHC 31.7 (*)     Gran # (ANC) 1.7 (*)     All other components within normal limits   COMPREHENSIVE METABOLIC PANEL - Abnormal; Notable for the following components:    CO2 22 (*)     Alkaline Phosphatase 53 (*)     All other components within normal limits   CK - Abnormal; Notable for the following components:     (*)     All other components within normal limits   URINALYSIS, REFLEX TO URINE CULTURE - Abnormal; Notable for  the following components:    Ketones, UA Trace (*)     All other components within normal limits    Narrative:     Specimen Source->Urine   DRUG SCREEN PANEL, URINE EMERGENCY - Abnormal; Notable for the following components:    THC Presumptive Positive (*)     All other components within normal limits    Narrative:     Specimen Source->Urine   INFLUENZA A & B BY MOLECULAR   SARS-COV-2 RNA AMPLIFICATION, QUAL   LIPASE   TROPONIN I         ALL LAB VALUES:  Results for orders placed or performed during the hospital encounter of 05/20/22   Influenza A & B by Molecular    Specimen: Nasopharyngeal Swab   Result Value Ref Range    Influenza A, Molecular Negative Negative    Influenza B, Molecular Negative Negative    Flu A & B Source Nasal swab    COVID-19 Rapid Screening   Result Value Ref Range    SARS-CoV-2 RNA, Amplification, Qual Negative Negative   CBC auto differential   Result Value Ref Range    WBC 4.33 3.90 - 12.70 K/uL    RBC 5.29 4.60 - 6.20 M/uL    Hemoglobin 14.0 14.0 - 18.0 g/dL    Hematocrit 44.1 40.0 - 54.0 %    MCV 83 82 - 98 fL    MCH 26.5 (L) 27.0 - 31.0 pg    MCHC 31.7 (L) 32.0 - 36.0 g/dL    RDW 13.2 11.5 - 14.5 %    Platelets 224 150 - 450 K/uL    MPV 9.5 9.2 - 12.9 fL    Immature Granulocytes 0.2 0.0 - 0.5 %    Gran # (ANC) 1.7 (L) 1.8 - 7.7 K/uL    Immature Grans (Abs) 0.01 0.00 - 0.04 K/uL    Lymph # 2.1 1.0 - 4.8 K/uL    Mono # 0.4 0.3 - 1.0 K/uL    Eos # 0.1 0.0 - 0.5 K/uL    Baso # 0.04 0.00 - 0.20 K/uL    nRBC 0 0 /100 WBC    Gran % 38.9 38.0 - 73.0 %    Lymph % 47.3 18.0 - 48.0 %    Mono % 9.7 4.0 - 15.0 %    Eosinophil % 3.0 0.0 - 8.0 %    Basophil % 0.9 0.0 - 1.9 %    Differential Method Automated    Comprehensive metabolic panel   Result Value Ref Range    Sodium 142 136 - 145 mmol/L    Potassium 4.4 3.5 - 5.1 mmol/L    Chloride 107 95 - 110 mmol/L    CO2 22 (L) 23 - 29 mmol/L    Glucose 85 70 - 110 mg/dL    BUN 13 6 - 20 mg/dL    Creatinine 1.3 0.5 - 1.4 mg/dL    Calcium 9.7 8.7 - 10.5  mg/dL    Total Protein 7.7 6.0 - 8.4 g/dL    Albumin 4.8 3.5 - 5.2 g/dL    Total Bilirubin 1.0 0.1 - 1.0 mg/dL    Alkaline Phosphatase 53 (L) 55 - 135 U/L    AST 21 10 - 40 U/L    ALT 16 10 - 44 U/L    Anion Gap 13 8 - 16 mmol/L    eGFR if African American >60 >60 mL/min/1.73 m^2    eGFR if non African American >60 >60 mL/min/1.73 m^2   Lipase   Result Value Ref Range    Lipase 19 4 - 60 U/L   CPK   Result Value Ref Range     (H) 20 - 200 U/L   Urinalysis, Reflex to Urine Culture Urine, Clean Catch    Specimen: Urine   Result Value Ref Range    Specimen UA Urine, Clean Catch     Color, UA Yellow Yellow, Straw, Nicole    Appearance, UA Clear Clear    pH, UA 7.0 5.0 - 8.0    Specific Gravity, UA 1.020 1.005 - 1.030    Protein, UA Negative Negative    Glucose, UA Negative Negative    Ketones, UA Trace (A) Negative    Bilirubin (UA) Negative Negative    Occult Blood UA Negative Negative    Nitrite, UA Negative Negative    Urobilinogen, UA Negative <2.0 EU/dL    Leukocytes, UA Negative Negative   Drug screen panel, emergency   Result Value Ref Range    Benzodiazepines Negative Negative    Methadone metabolites Negative Negative    Cocaine (Metab.) Negative Negative    Opiate Scrn, Ur Negative Negative    Barbiturate Screen, Ur Negative Negative    Amphetamine Screen, Ur Negative Negative    THC Presumptive Positive (A) Negative    Phencyclidine Negative Negative    Creatinine, Urine 244.5 23.0 - 375.0 mg/dL    Toxicology Information SEE COMMENT    Troponin I   Result Value Ref Range    Troponin I 0.018 0.000 - 0.026 ng/mL           RADIOLOGY STUDIES:  Imaging Results    None                   The above vital signs and test results have been reviewed by the emergency provider.     ED Medications:  Discharge Medication List as of 5/20/2022  6:23 PM      START taking these medications    Details   diclofenac (VOLTAREN) 50 MG EC tablet Take 1 tablet (50 mg total) by mouth 3 (three) times daily as needed., Starting Fri  5/20/2022, Print      promethazine (PHENERGAN) 25 MG tablet Take 1 tablet (25 mg total) by mouth every 6 (six) hours as needed for Nausea., Starting Fri 5/20/2022, Print           Discharge Medications:  Discharge Medication List as of 5/20/2022  6:23 PM      START taking these medications    Details   diclofenac (VOLTAREN) 50 MG EC tablet Take 1 tablet (50 mg total) by mouth 3 (three) times daily as needed., Starting Fri 5/20/2022, Print      promethazine (PHENERGAN) 25 MG tablet Take 1 tablet (25 mg total) by mouth every 6 (six) hours as needed for Nausea., Starting Fri 5/20/2022, Print             Follow-up Information     Thom Jara MD.    Specialty: Internal Medicine  Contact information:  [address] [address] [address] [phone]                        Patient states that he is feeling much better is ready for discharge.    I discussed with patient and/or family/caretaker that evaluation in the ED does not suggest any emergent or life threatening medical conditions requiring immediate intervention beyond what was provided in the ED, and I believe patient is safe for discharge. Regardless, an unremarkable evaluation in the ED does not preclude the development or presence of a serious or life threatening condition. As such, patient was instructed to return immediately for any worsening or change in current symptoms.    Rehab information given.    MEDICAL DECISION MAKING                 CLINICAL IMPRESSION       ICD-10-CM ICD-9-CM   1. Myalgia  M79.10 729.1   2. Non-intractable vomiting with nausea, unspecified vomiting type  R11.2 787.01   3. Mild tetrahydrocannabinol (THC) abuse  F12.10 305.20       Disposition:   Disposition: Discharged  Condition: Stable         Terry ZavalaDONOVAN  05/22/22 3081

## 2024-05-11 ENCOUNTER — HOSPITAL ENCOUNTER (EMERGENCY)
Facility: HOSPITAL | Age: 29
Discharge: HOME | End: 2024-05-11
Attending: EMERGENCY MEDICINE
Payer: COMMERCIAL

## 2024-05-11 VITALS
HEART RATE: 82 BPM | OXYGEN SATURATION: 97 % | HEIGHT: 72 IN | TEMPERATURE: 98 F | DIASTOLIC BLOOD PRESSURE: 58 MMHG | WEIGHT: 142.63 LBS | BODY MASS INDEX: 19.32 KG/M2 | SYSTOLIC BLOOD PRESSURE: 121 MMHG | RESPIRATION RATE: 20 BRPM

## 2024-05-11 DIAGNOSIS — K08.89 PAIN, DENTAL: Primary | ICD-10-CM

## 2024-05-11 PROCEDURE — 99284 EMERGENCY DEPT VISIT MOD MDM: CPT

## 2024-05-11 PROCEDURE — 25000003 PHARM REV CODE 250: Performed by: NURSE PRACTITIONER

## 2024-05-11 RX ORDER — TRAMADOL HYDROCHLORIDE 50 MG/1
50 TABLET ORAL EVERY 6 HOURS PRN
Qty: 12 TABLET | Refills: 0 | Status: SHIPPED | OUTPATIENT
Start: 2024-05-11 | End: 2024-05-14

## 2024-05-11 RX ORDER — TRAMADOL HYDROCHLORIDE 50 MG/1
50 TABLET ORAL
Status: COMPLETED | OUTPATIENT
Start: 2024-05-11 | End: 2024-05-11

## 2024-05-11 RX ORDER — AMOXICILLIN AND CLAVULANATE POTASSIUM 875; 125 MG/1; MG/1
1 TABLET, FILM COATED ORAL
Status: COMPLETED | OUTPATIENT
Start: 2024-05-11 | End: 2024-05-11

## 2024-05-11 RX ORDER — AMOXICILLIN AND CLAVULANATE POTASSIUM 875; 125 MG/1; MG/1
1 TABLET, FILM COATED ORAL 2 TIMES DAILY
Qty: 14 TABLET | Refills: 0 | Status: SHIPPED | OUTPATIENT
Start: 2024-05-11 | End: 2024-05-18

## 2024-05-11 RX ADMIN — TRAMADOL HYDROCHLORIDE 50 MG: 50 TABLET, COATED ORAL at 10:05

## 2024-05-11 RX ADMIN — AMOXICILLIN AND CLAVULANATE POTASSIUM 1 TABLET: 875; 125 TABLET, FILM COATED ORAL at 10:05

## 2024-05-12 NOTE — ED PROVIDER NOTES
Encounter Date: 5/11/2024       History     Chief Complaint   Patient presents with    Dental Pain     Left bottom started on Thursday      Patient presents to ER for left lower dental pain, onset 2 days ago.  Denies any associated symptoms.  Pain has been constant since onset.  He has taken over-the-counter Advil which initially provided relief.  He denies oral swelling, difficulty swallowing, fever, chills, generalized body aches.    The history is provided by the patient.     Review of patient's allergies indicates:   Allergen Reactions    Guaifenesin Hives and Shortness Of Breath    Guaifenesin-sodium citrate      Past Medical History:   Diagnosis Date    Allergy      Past Surgical History:   Procedure Laterality Date    OPEN REDUCTION AND INTERNAL FIXATION (ORIF) OF INJURY OF HAND Right 2/21/2020    Procedure: ORIF, HAND;  Surgeon: Rafiq Garcia MD;  Location: [facility];  Service: Orthopedics;  Laterality: Right;     Family History   Problem Relation Name Age of Onset    No Known Problems Mother      No Known Problems Father      Melanoma Neg Hx      Psoriasis Neg Hx      Lupus Neg Hx      Eczema Neg Hx       Social History     Tobacco Use    Smoking status: Never    Smokeless tobacco: Never   Substance Use Topics    Alcohol use: No    Drug use: Yes     Types: Marijuana     Review of Systems   Constitutional:  Negative for chills, fatigue and fever.   HENT:  Positive for dental problem. Negative for congestion, ear pain, rhinorrhea, sinus pain and sore throat.    Eyes:  Negative for pain.   Respiratory:  Negative for cough and shortness of breath.    Cardiovascular:  Negative for chest pain.   Gastrointestinal:  Negative for abdominal pain, nausea and vomiting.   Musculoskeletal:  Negative for back pain, myalgias and neck pain.   Skin:  Negative for rash.   Neurological:  Negative for weakness and headaches.       Physical Exam     Initial Vitals [05/11/24 2016]   BP Pulse Resp Temp SpO2   (!) 121/58 82 18 98.1  °F (36.7 °C) 97 %      MAP       --         Physical Exam    Nursing note and vitals reviewed.  Constitutional: He is not diaphoretic. He is cooperative.  Non-toxic appearance. He does not have a sickly appearance. He does not appear ill. No distress.   HENT:   Head: Normocephalic and atraumatic.   Right Ear: Tympanic membrane and external ear normal.   Left Ear: Tympanic membrane and external ear normal.   Nose: Nose normal.   Mouth/Throat: Oropharynx is clear and moist and mucous membranes are normal. Dental caries present. No dental abscesses. No oropharyngeal exudate, posterior oropharyngeal edema or posterior oropharyngeal erythema.       Eyes: Conjunctivae are normal.   Neck: Neck supple.   Normal range of motion.  Cardiovascular:  Normal rate, regular rhythm and intact distal pulses.           Pulmonary/Chest: Breath sounds normal. No respiratory distress.   Musculoskeletal:         General: Normal range of motion.      Cervical back: Normal range of motion and neck supple.     Neurological: He is alert and oriented to person, place, and time. He has normal strength. GCS score is 15. GCS eye subscore is 4. GCS verbal subscore is 5. GCS motor subscore is 6.   Skin: Skin is warm and dry. Capillary refill takes less than 2 seconds.         ED Course   Procedures  Labs Reviewed - No data to display       Imaging Results    None          Medications - No data to display  Medical Decision Making  Risk  Prescription drug management.                   No intraoral edema, no sign of dental abscess.  No drainage.  Discussed outpatient follow-up with his dentist.  Discussed signs symptoms to return to ER.  Patient agrees with plan and voiced no further concerns.  I discussed with patient that evaluation in the ED does not suggest any emergent or life threatening medical conditions requiring immediate intervention beyond what was provided in the ED, and I believe patient is safe for discharge. Regardless, an unremarkable  evaluation in the ED does not preclude the development or presence of a serious of life threatening condition. As such, patient was instructed to return immediately for any worsening or change in current symptoms.                   Clinical Impression:  Final diagnoses:  [K08.89] Pain, dental (Primary)          ED Disposition Condition    Discharge Stable          ED Prescriptions       Medication Sig Dispense Start Date End Date Auth. Provider    amoxicillin-clavulanate 875-125mg (AUGMENTIN) 875-125 mg per tablet Take 1 tablet by mouth 2 (two) times daily. for 7 days 14 tablet 5/11/2024 5/18/2024 Ziyad Saleh NP    traMADoL (ULTRAM) 50 mg tablet Take 1 tablet (50 mg total) by mouth every 6 (six) hours as needed for Pain. 12 tablet 5/11/2024 5/14/2024 Ziyad Saleh NP          Follow-up Information       Follow up With Specialties Details Why Contact Info    Follow-up with your dentist in 1 day.        [facility] - Emergency Dept. Emergency Medicine  As needed, If symptoms worsen [id number] [facility] [address] [phone]             Ziyad Saleh NP  05/11/24 2547

## 2024-09-29 ENCOUNTER — HOSPITAL ENCOUNTER (EMERGENCY)
Facility: HOSPITAL | Age: 29
Discharge: HOME | End: 2024-09-29
Attending: EMERGENCY MEDICINE
Payer: COMMERCIAL

## 2024-09-29 VITALS
BODY MASS INDEX: 19.53 KG/M2 | RESPIRATION RATE: 18 BRPM | HEART RATE: 51 BPM | OXYGEN SATURATION: 98 % | HEIGHT: 72 IN | SYSTOLIC BLOOD PRESSURE: 114 MMHG | WEIGHT: 144.19 LBS | TEMPERATURE: 98 F | DIASTOLIC BLOOD PRESSURE: 66 MMHG

## 2024-09-29 DIAGNOSIS — K08.89 PAIN, DENTAL: ICD-10-CM

## 2024-09-29 DIAGNOSIS — K04.7 DENTAL INFECTION: Primary | ICD-10-CM

## 2024-09-29 LAB
HCV AB SERPL QL IA: NEGATIVE
HEP C VIRUS HOLD SPECIMEN: NORMAL
HIV 1+2 AB+HIV1 P24 AG SERPL QL IA: NEGATIVE

## 2024-09-29 PROCEDURE — 87389 HIV-1 AG W/HIV-1&-2 AB AG IA: CPT | Performed by: EMERGENCY MEDICINE

## 2024-09-29 PROCEDURE — 86803 HEPATITIS C AB TEST: CPT | Performed by: EMERGENCY MEDICINE

## 2024-09-29 PROCEDURE — 99284 EMERGENCY DEPT VISIT MOD MDM: CPT

## 2024-09-29 RX ORDER — TRAMADOL HYDROCHLORIDE 50 MG/1
50 TABLET ORAL EVERY 6 HOURS PRN
Qty: 12 TABLET | Refills: 0 | Status: SHIPPED | OUTPATIENT
Start: 2024-09-29

## 2024-09-29 RX ORDER — CLINDAMYCIN HYDROCHLORIDE 150 MG/1
450 CAPSULE ORAL 3 TIMES DAILY
Qty: 63 CAPSULE | Refills: 0 | Status: SHIPPED | OUTPATIENT
Start: 2024-09-29 | End: 2024-10-06

## 2024-09-29 NOTE — Clinical Note
"Oswaldo Cooper" Dwain was seen and treated in our emergency department on 9/29/2024.  He may return to work on 10/01/2024.       If you have any questions or concerns, please don't hesitate to call.      Andres Amato, DONOVAN"

## 2024-09-30 NOTE — ED PROVIDER NOTES
Encounter Date: 9/29/2024       History     Chief Complaint   Patient presents with    Dental Pain     Left lower, x 2 days     29-year-old male presents to the emergency department with chief complaint of dental pain.  The patient reports that the pain began at about 12:00 p.m. this afternoon.  Reports pain has been continuous.  Reports pain has been worsening.  Reports that he has a broken tooth in his left lower gumline.  He denies seeing a dentist for the problem.  He reports pain is dull and throbbing.  He denies any shortness for breath, fever, chills, trouble swallowing, trouble breathing, chest pain, oral swelling, facial pain, facial swelling, trouble closing his mouth, or any other symptoms.        Review of patient's allergies indicates:   Allergen Reactions    Guaifenesin Hives and Shortness Of Breath    Guaifenesin-sodium citrate      Past Medical History:   Diagnosis Date    Allergy      Past Surgical History:   Procedure Laterality Date    OPEN REDUCTION AND INTERNAL FIXATION (ORIF) OF INJURY OF HAND Right 2/21/2020    Procedure: ORIF, HAND;  Surgeon: Rafiq Garcia MD;  Location: [facility];  Service: Orthopedics;  Laterality: Right;     Family History   Problem Relation Name Age of Onset    No Known Problems Mother      No Known Problems Father      Melanoma Neg Hx      Psoriasis Neg Hx      Lupus Neg Hx      Eczema Neg Hx       Social History     Tobacco Use    Smoking status: Never    Smokeless tobacco: Never   Substance Use Topics    Alcohol use: No    Drug use: Yes     Types: Marijuana     Review of Systems   Constitutional:  Negative for chills, fatigue and fever.   HENT:  Positive for dental problem. Negative for facial swelling, sore throat, trouble swallowing and voice change.    Respiratory:  Negative for shortness of breath.    Cardiovascular:  Negative for chest pain.   Gastrointestinal:  Negative for abdominal pain and nausea.   Genitourinary:  Negative for dysuria.   Musculoskeletal:   Negative for back pain.   Skin:  Negative for rash.   Neurological:  Negative for dizziness, weakness and numbness.   Hematological:  Does not bruise/bleed easily.       Physical Exam     Initial Vitals [09/29/24 1849]   BP Pulse Resp Temp SpO2   114/66 (!) 51 18 98.2 °F (36.8 °C) 98 %      MAP       --         Physical Exam    Nursing note and vitals reviewed.  Constitutional: He appears well-developed and well-nourished.  Non-toxic appearance. He does not have a sickly appearance. He does not appear ill.   HENT:   Head: Normocephalic and atraumatic.   Right Ear: Hearing normal.   Left Ear: Hearing normal.   Nose: Nose normal. Mouth/Throat: Uvula is midline, oropharynx is clear and moist and mucous membranes are normal. No trismus in the jaw. Dental caries present. No dental abscesses or uvula swelling.       Eyes: Conjunctivae, EOM and lids are normal. Pupils are equal, round, and reactive to light.   Neck: Trachea normal. Neck supple.   Normal range of motion.  Cardiovascular:  Normal rate, regular rhythm, normal heart sounds, intact distal pulses and normal pulses.           Pulmonary/Chest: Effort normal and breath sounds normal.   Abdominal: Abdomen is soft. Bowel sounds are normal. There is no abdominal tenderness. There is no rebound and no guarding.   Musculoskeletal:         General: Normal range of motion.      Cervical back: Normal, normal range of motion and neck supple.     Neurological: He is alert and oriented to person, place, and time. He has normal strength and normal reflexes. No cranial nerve deficit or sensory deficit. GCS eye subscore is 4. GCS verbal subscore is 5. GCS motor subscore is 6.   Skin: Skin is warm and dry. Capillary refill takes less than 2 seconds.   Psychiatric: He has a normal mood and affect. His behavior is normal. Thought content normal.         ED Course   Procedures  Labs Reviewed   HEP C VIRUS HOLD SPECIMEN       Result Value    HEP C Virus Hold Specimen Hold for HCV  sendout      Narrative:     Release to patient->Immediate   HIV 1 / 2 ANTIBODY   HEPATITIS C ANTIBODY          Imaging Results    None          Medications - No data to display  Medical Decision Making  29-year-old male with a broken left lower 3rd molar.  There is mild erythema noted, no drainage, no pus, no significant swelling, no abscess formation, no fluctuation, no facial swelling, facial erythema, sublingual, submandibular, submental swelling, he is nontoxic appearing, speaking freely, no stridor, no airway involvement.  Patient will follow up with the dentist.  The patient will be given prescription of antibiotics and pain medication for the next couple of days.  Discussed with the patient not to take the pain medication and drive.  He verbalized understanding.  Patient will follow up with Dentistry, he will return to the emergency department with any new or worsening symptoms.    I discussed with patient and/or family/caretaker that evaluation in the ED does not suggest any emergent or life threatening medical conditions requiring immediate intervention beyond what was provided in the ED, and I believe patient is safe for discharge. Regardless, an unremarkable evaluation in the ED does not preclude the development or presence of a serious of life threatening condition. As such, patient was instructed to return immediately for any worsening or change in current symptoms.     Risk  Prescription drug management.                                      Clinical Impression:  Final diagnoses:  [K04.7] Dental infection (Primary)  [K08.89] Pain, dental          ED Disposition Condition    Discharge Stable          ED Prescriptions       Medication Sig Dispense Start Date End Date Auth. Provider    clindamycin (CLEOCIN) 150 MG capsule Take 3 capsules (450 mg total) by mouth 3 (three) times daily. for 7 days 63 capsule 9/29/2024 10/6/2024 Andres Amato, NP    traMADoL (ULTRAM) 50 mg tablet Take 1 tablet (50 mg  total) by mouth every 6 (six) hours as needed for Pain. 12 tablet 9/29/2024 -- Andres Amato NP          Follow-up Information       Follow up With Specialties Details Why Contact Info    [facility] Emergency Dept. Emergency Medicine Go to  As needed, If symptoms worsen [id number] [facility] [address] [phone]    Dentist  Schedule an appointment as soon as possible for a visit                Andres Amato NP  09/29/24 1536

## 2025-06-20 ENCOUNTER — OFFICE VISIT (OUTPATIENT)
Dept: FAMILY MEDICINE | Facility: CLINIC | Age: 30
End: 2025-06-20
Payer: COMMERCIAL

## 2025-06-20 ENCOUNTER — HOSPITAL ENCOUNTER (OUTPATIENT)
Dept: RADIOLOGY | Facility: HOSPITAL | Age: 30
Discharge: HOME | End: 2025-06-20
Attending: NURSE PRACTITIONER
Payer: COMMERCIAL

## 2025-06-20 VITALS
BODY MASS INDEX: 18.98 KG/M2 | OXYGEN SATURATION: 97 % | HEART RATE: 77 BPM | DIASTOLIC BLOOD PRESSURE: 80 MMHG | HEIGHT: 72 IN | TEMPERATURE: 98 F | SYSTOLIC BLOOD PRESSURE: 100 MMHG | WEIGHT: 140.13 LBS

## 2025-06-20 DIAGNOSIS — S99.921A INJURY OF RIGHT FOOT, INITIAL ENCOUNTER: Primary | ICD-10-CM

## 2025-06-20 DIAGNOSIS — S99.921A INJURY OF RIGHT FOOT, INITIAL ENCOUNTER: ICD-10-CM

## 2025-06-20 PROCEDURE — 73630 X-RAY EXAM OF FOOT: CPT | Mod: TC,PO,RT

## 2025-06-20 PROCEDURE — 73630 X-RAY EXAM OF FOOT: CPT | Mod: 26,RT,, | Performed by: STUDENT IN AN ORGANIZED HEALTH CARE EDUCATION/TRAINING PROGRAM

## 2025-06-20 PROCEDURE — 99999 PR PBB SHADOW E&M-EST. PATIENT-LVL IV: CPT | Mod: PBBFAC,,, | Performed by: NURSE PRACTITIONER

## 2025-06-20 RX ORDER — DICLOFENAC SODIUM 50 MG/1
50 TABLET, DELAYED RELEASE ORAL 3 TIMES DAILY PRN
Qty: 15 TABLET | Refills: 0 | Status: SHIPPED | OUTPATIENT
Start: 2025-06-20

## 2025-06-20 NOTE — LETTER
June 20, 2025      [facility]  [address]  [facility] [address]  Phone: [phone]  Fax: [phone]       Patient: Oswaldo Prakash   YOB: 1995  Date of Visit: 06/20/2025    To Whom It May Concern:    Lexi Prakash  was at [facility] on 06/20/2025. The patient may return to work/school on 06/26/2025 with no restrictions. If you have any questions or concerns, or if I can be of further assistance, please do not hesitate to contact me.    Sincerely,    Hai Stuart MA

## 2025-06-20 NOTE — PROGRESS NOTES
Subjective:       Patient ID: Oswaldo Prakash Jr. is a 30 y.o. male.    Chief Complaint: Foot Injury    History of Present Illness    Patient presents today with right foot pain after stepping on something at home. He reports acute right foot pain following an injury that occurred while stepping flat-footed on the ground. Pain radiates from the third toe down the foot, with most intense pain in the heel region. He describes the pain as severe, preventing weight-bearing and limiting mobility. He experiences significant pain with heel pressure and discomfort when flexing the third toe. He denies pain in other areas of the foot. No visible swelling is noted. He has not taken any pain medication for the injury. He has a history of hand fracture, details unspecified.      ROS:  General: -fever, -chills, -fatigue, -weight gain, -weight loss  Eyes: -vision changes, -redness, -discharge  ENT: -ear pain, -nasal congestion, -sore throat, +frequent sneezing  Cardiovascular: -chest pain, -palpitations, -lower extremity edema  Respiratory: -cough, -shortness of breath  Gastrointestinal: -abdominal pain, +nausea, -vomiting, -diarrhea, -constipation, -blood in stool  Genitourinary: -dysuria, -hematuria, -frequency  Musculoskeletal: -joint pain, -muscle pain, +limb pain, +difficulty standing up  Skin: -rash, -lesion  Neurological: -headache, -dizziness, -numbness, -tingling  Psychiatric: -anxiety, -depression, -sleep difficulty        Past Medical History:   Diagnosis Date    Allergy       Medications Ordered Prior to Encounter[1]       Objective:      Physical Exam    General: In no acute distress.  Head: Normocephalic. Non traumatic.  Eyes: PERRLA. EOMs full. Conjunctivae clear. Fundi grossly normal.  Ears: EACs clear. TMs normal.  Nose: Mucosa pink. Mucosa moist. No obstruction.  Throat: Clear. No exudates. No lesions.  Neck: Supple. No masses. No thyromegaly. No bruits.  Chest: Lungs clear. No rales. No rhonchi. No  wheezes.  Heart: RRR. No murmurs. No rubs. No gallops.  Abdomen: Soft. No tenderness. No masses. BS normal.  : Normal external genitalia. No lesions. No discharge. No hernias  noted.  Back: Normal curvature. No scoliosis. No tenderness.  Extremities: Warm. Well perfused. No upper extremity edema. No lower extremity edema. FROM. No deformities. No joint erythema.  Neuro: No focal deficits appreciated. Good muscle tone. Normal response to visual stimuli. Normal response to auditory stimuli.  Skin: Normal. No rashes. No lesions noted.  MSK: Foot/Ankle - Right: PAIN IN RIGHT THIRD TOE. Normal toe movement. PAIN WITH TOE FLEXION. Plantar tenderness         Assessment/Plan:     1. Injury of right foot, initial encounter       Assessment & Plan    RIGHT FOOT PAIN AND INJURY:  - negative for fracture.  Diclofenac, RICE; crutches and CAM walker given            No follow-ups on file.    This note was generated with the assistance of ambient listening technology. Verbal consent was obtained by the patient and accompanying visitor(s) for the recording of patient appointment to facilitate this note. I attest to having reviewed and edited the generated note for accuracy, though some syntax or spelling errors may persist. Please contact the author of this note for any clarification.            [1]   Current Outpatient Medications on File Prior to Visit   Medication Sig Dispense Refill    azelastine-fluticasone 137-50 mcg/spray [place] 1 each by Nasal route 2 (two) times daily. 23 g 1    fluticasone propionate (FLONASE) 50 mcg/actuation nasal spray 1 spray (50 mcg total) by Each Nostril route 2 (two) times daily as needed. (Patient not taking: Reported on 6/20/2025) 15 g 0    ibuprofen (ADVIL,MOTRIN) 100 mg/5 mL suspension Take 30 mLs (600 mg total) by mouth every 6 (six) hours as needed for Pain or Temperature greater than. (Patient not taking: Reported on 6/20/2025) 300 mL 0    ondansetron (ZOFRAN) 4 MG tablet Take 1 tablet (4 mg  total) by mouth every 6 (six) hours as needed. (Patient not taking: Reported on 6/20/2025) 20 tablet 0    promethazine (PHENERGAN) 25 MG tablet Take 1 tablet (25 mg total) by mouth every 6 (six) hours as needed for Nausea. (Patient not taking: Reported on 6/20/2025) 15 tablet 0    promethazine-dextromethorphan (PROMETHAZINE-DM) 6.25-15 mg/5 mL Syrp Take 5 mLs by mouth every 6 (six) hours as needed. (Patient not taking: Reported on 6/20/2025)      traMADoL (ULTRAM) 50 mg tablet Take 1 tablet (50 mg total) by mouth every 6 (six) hours as needed for Pain. (Patient not taking: Reported on 6/20/2025) 12 tablet 0    [DISCONTINUED] diclofenac (VOLTAREN) 50 MG EC tablet Take 1 tablet (50 mg total) by mouth 3 (three) times daily as needed. (Patient not taking: Reported on 6/20/2025) 15 tablet 0     No current facility-administered medications on file prior to visit.

## 2025-06-20 NOTE — PROGRESS NOTES
Subjective:       Patient ID: Oswaldo Prakash Jr. is a 30 y.o. male.    Chief Complaint: Foot Injury  Pt reports to clinic with chief co  HPI  Review of Systems    Objective:      Physical Exam    Assessment:       1. Injury of right foot, initial encounter        Plan:   1. Injury of right foot, initial encounter  -     X-Ray Foot Complete 3 view Right; Future; Expected date: 06/20/2025  -     diclofenac (VOLTAREN) 50 MG EC tablet; Take 1 tablet (50 mg total) by mouth 3 (three) times daily as needed.  Dispense: 15 tablet; Refill: 0       No follow-ups on file.

## 2025-06-23 ENCOUNTER — RESULTS FOLLOW-UP (OUTPATIENT)
Dept: FAMILY MEDICINE | Facility: CLINIC | Age: 30
End: 2025-06-23

## 2025-06-25 ENCOUNTER — TELEPHONE (OUTPATIENT)
Dept: FAMILY MEDICINE | Facility: CLINIC | Age: 30
End: 2025-06-25
Payer: COMMERCIAL

## 2025-06-25 NOTE — TELEPHONE ENCOUNTER
Called patient back and explained since he was still having issues ms yun was going to place a referral to podiatry for him. He verbalized understanding. Also explained to him that Ms yun couldn't do FMLA paperwork for an extended period of time. Verbalized understanding. I said she could possible do it until he got in to see podiatry.

## 2025-06-27 ENCOUNTER — TELEPHONE (OUTPATIENT)
Dept: FAMILY MEDICINE | Facility: CLINIC | Age: 30
End: 2025-06-27
Payer: COMMERCIAL

## 2025-06-27 ENCOUNTER — PATIENT MESSAGE (OUTPATIENT)
Dept: FAMILY MEDICINE | Facility: CLINIC | Age: 30
End: 2025-06-27
Payer: COMMERCIAL

## 2025-06-27 DIAGNOSIS — S93.601D SPRAIN OF RIGHT FOOT, SUBSEQUENT ENCOUNTER: Primary | ICD-10-CM

## 2025-06-27 NOTE — TELEPHONE ENCOUNTER
Calling patient and explained to him that we will do an ortho referral for him instead because ethe wait was too long. Gave him the  fax number to send his fmal paperwork too. Got him scheduled with ortho for July 11th.

## 2025-06-27 NOTE — TELEPHONE ENCOUNTER
Patient stated he was feeling pain in his achilles and we told him he had to go do an mri. Sent office excuse to patient via my chart

## 2025-06-27 NOTE — LETTER
June 27, 2025      [facility]  [address]  [facility] [address]  Phone: [phone]  Fax: [phone]       Patient: Oswaldo Prakash   YOB: 1995  Date of Visit: 06/27/2025    To Whom It May Concern:    Lexi Prakash  was at [facility] on 06/27/2025. The patient may return to work on July 12th 2025 with no restrictions. If you have any questions or concerns, or if I can be of further assistance, please do not hesitate to contact me.    Sincerely,    Hai Stuart MA/Carmencita Greenwood NP

## 2025-06-30 ENCOUNTER — PATIENT MESSAGE (OUTPATIENT)
Dept: FAMILY MEDICINE | Facility: CLINIC | Age: 30
End: 2025-06-30
Payer: COMMERCIAL

## 2025-07-03 ENCOUNTER — TELEPHONE (OUTPATIENT)
Dept: FAMILY MEDICINE | Facility: CLINIC | Age: 30
End: 2025-07-03
Payer: COMMERCIAL

## 2025-07-03 ENCOUNTER — PATIENT MESSAGE (OUTPATIENT)
Dept: FAMILY MEDICINE | Facility: CLINIC | Age: 30
End: 2025-07-03
Payer: COMMERCIAL

## 2025-07-03 NOTE — TELEPHONE ENCOUNTER
Called patient back and he explained he would fax over paperwork and come hand it to us in person.

## 2025-07-07 ENCOUNTER — TELEPHONE (OUTPATIENT)
Dept: FAMILY MEDICINE | Facility: CLINIC | Age: 30
End: 2025-07-07
Payer: COMMERCIAL

## 2025-07-07 ENCOUNTER — PATIENT MESSAGE (OUTPATIENT)
Dept: FAMILY MEDICINE | Facility: CLINIC | Age: 30
End: 2025-07-07
Payer: COMMERCIAL

## 2025-07-07 NOTE — TELEPHONE ENCOUNTER
Called patient back and he explained to me [employer] has to fax over the est of the paperwork over to us.

## 2025-07-11 ENCOUNTER — OFFICE VISIT (OUTPATIENT)
Dept: ORTHOPEDICS | Facility: CLINIC | Age: 30
End: 2025-07-11
Payer: COMMERCIAL

## 2025-07-11 VITALS — BODY MASS INDEX: 19.02 KG/M2 | WEIGHT: 140.19 LBS

## 2025-07-11 DIAGNOSIS — M76.60 ACHILLES TENDON PAIN: ICD-10-CM

## 2025-07-11 DIAGNOSIS — M25.571 PAIN IN JOINT INVOLVING RIGHT ANKLE AND FOOT: Primary | ICD-10-CM

## 2025-07-11 DIAGNOSIS — S93.601D SPRAIN OF RIGHT FOOT, SUBSEQUENT ENCOUNTER: ICD-10-CM

## 2025-07-11 PROCEDURE — 99999 PR PBB SHADOW E&M-EST. PATIENT-LVL IV: CPT | Mod: PBBFAC,,, | Performed by: PHYSICIAN ASSISTANT

## 2025-07-11 NOTE — PROGRESS NOTES
Cruz Quick PA-C  Orthopedic Surgery / Sports Medicine  [facility]      PATIENT ID: Oswaldo Prakash Jr.  YOB: 1995  MRN: [id number]    CHIEF COMPLAINT: Pain of the Right Foot (4/10 pain when not on in use /8/10 pain when using foot or pressure is applied )    REFERRED BY: Carmencita Greenwood    HISTORY OF PRESENT ILLNESS:   History of Present Illness    CHIEF COMPLAINT:  Right foot and ankle pain    HPI:  Patient presents for evaluation of right foot and ankle pain that began on 6/19/25 when he stomped his foot on the floor while wearing slippers instead of his usual work boots. Pain was initially widespread but is now localized mainly to the back of the heel, the right side, and the bottom of the right foot area. He notes some improvement since the initial injury but reports difficulty walking and discomfort when applying pressure to the heel. He reports tenderness in the back of the ankle and heel area, describing it as similar to a knot. Pain worsens with downward pressure, and he finds side-to-side movement difficult, particularly to the high side. A large bruise was present in the arch of his foot, which may still be visible.    He has been using the RICE method and wearing a boot for treatment. XRs were performed, which ruled out fractures. He is currently on pending status for short-term disability due to this injury.    He denies any prior history of foot or ankle injuries.  He recently saw his primary care provider who performed x-rays on 06/20/25 and referred the patient here today for further assessment.  Denies any fever night sweats chills.  No numbness or tingling.  No other issues or other orthopedic complaints at this time.    PREVIOUS TREATMENTS:  Patient has been using a boot for comfort and applying the RICE (Rest, Ice, Compression, Elevation) method for treatment.    IMAGING:  An X-ray of the foot was performed, which showed no fractures.    WORK  STATUS:  Patient works for [employer]. He is currently on pending status with short-term disability. Due to his foot injury, he is unable to work. He has received a note stating he is out of work from his PCP.          Patient was queried and this is the extent of the patients current complaints today.      PAST MEDICAL HISTORY:   Estimated body mass index is 19.02 kg/m² as calculated from the following:    Height as of 6/20/25: 6' (1.829 m).    Weight as of this encounter: 63.6 kg (140 lb 3.4 oz).  Past Medical History:   Diagnosis Date    Allergy      Past Surgical History:   Procedure Laterality Date    OPEN REDUCTION AND INTERNAL FIXATION (ORIF) OF INJURY OF HAND Right 2/21/2020    Procedure: ORIF, HAND;  Surgeon: Rafiq Garcia MD;  Location: [facility];  Service: Orthopedics;  Laterality: Right;     Family History   Problem Relation Name Age of Onset    No Known Problems Mother      No Known Problems Father      Melanoma Neg Hx      Psoriasis Neg Hx      Lupus Neg Hx      Eczema Neg Hx       Social History[1]  Medication List with Changes/Refills   Current Medications    AZELASTINE-FLUTICASONE 137-50 MCG/SPRAY SPRY    1 each by Nasal route 2 (two) times daily.    DICLOFENAC (VOLTAREN) 50 MG EC TABLET    Take 1 tablet (50 mg total) by mouth 3 (three) times daily as needed.    FLUTICASONE PROPIONATE (FLONASE) 50 MCG/ACTUATION NASAL SPRAY    1 spray (50 mcg total) by Each Nostril route 2 (two) times daily as needed.    IBUPROFEN (ADVIL,MOTRIN) 100 MG/5 ML SUSPENSION    Take 30 mLs (600 mg total) by mouth every 6 (six) hours as needed for Pain or Temperature greater than.    ONDANSETRON (ZOFRAN) 4 MG TABLET    Take 1 tablet (4 mg total) by mouth every 6 (six) hours as needed.    PROMETHAZINE (PHENERGAN) 25 MG TABLET    Take 1 tablet (25 mg total) by mouth every 6 (six) hours as needed for Nausea.    PROMETHAZINE-DEXTROMETHORPHAN (PROMETHAZINE-DM) 6.25-15 MG/5 ML SYRP    Take 5 mLs by mouth every 6 (six) hours as needed.     TRAMADOL (ULTRAM) 50 MG TABLET    Take 1 tablet (50 mg total) by mouth every 6 (six) hours as needed for Pain.     Review of patient's allergies indicates:   Allergen Reactions    Guaifenesin Hives and Shortness Of Breath    Guaifenesin-sodium citrate      Review of Systems   Constitutional: Negative for chills, fever, night sweats, weight gain and weight loss.   Respiratory:  Negative for shortness of breath.    Skin:  Negative for rash and suspicious lesions.   Musculoskeletal:  Positive for joint pain (right ankle/foot).   Gastrointestinal:  Negative for bowel incontinence, nausea and vomiting.   Genitourinary:  Negative for bladder incontinence.   Neurological:  Negative for numbness, paresthesias and sensory change.   Psychiatric/Behavioral:  Negative for altered mental status.        PHYSICAL EXAM:   GENERAL: Well appearing, appropriate for stated age, no acute distress, well nourished.  CARDIOVASCULAR:  No obvious cyanosis, extremities warm and well perfused.  PULMONARY: Normal respiratory effort, even and unlabored respirations.  NEURO: Awake, alert, and oriented x 3. NAD.  PSYCH: Mood & affect are appropriate.  SKIN: No readily visible rashes or skin breakdown.  HEENT: Head is normocephalic and atraumatic.        General Musculoskeletal Exam   Gait: antalgic     Right Ankle/Foot Exam     Inspection   Scars: absent  Deformity: absent  Erythema: absent  Bruising: Ankle - present Foot - present  Effusion: Ankle - present Foot - present  Atrophy: Ankle - absent Foot - absent    Swelling   The patient is swollen on the Achilles insertion, Achilles tendon, anterior talofibular ligament and lateral malleolus.    Tenderness   The patient is tender to palpation of the Achilles insertion, Achilles tendon, ATF and lateral malleolus.    Pain   The patient exhibits pain of the Achilles insertion, Achilles tendon, anterior talofibular ligament and lateral malleolus.    Range of Motion   Ankle Joint   Dorsiflexion:   abnormal   Plantar flexion:  abnormal   Subtalar Joint   Inversion:  abnormal   Eversion:  abnormal     Muscle Strength   The patient has normal right ankle strength.    Other   Sensation: normal    Comments:  Tenderness to palpation distal Achilles around the insertion area, tenderness lateral ankle into the lateral midfoot    Left Ankle/Foot Exam   Left ankle exam is normal.      Vascular Exam     Right Pulses  Dorsalis Pedis:      2+  Posterior Tibial:      2+        Edema  Right Lower Leg: present (mild)        IMAGING:  Relevant imaging results reviewed and interpreted by me, discussed with the patient and / or family today.     X-Ray Foot Complete 3 view Right  Order date: 6/20/2025  Authorizing: Carmencita Greenwood NP  Ordered by Carmencita Greenwood NP on 6/20/2025.     Narrative & Impression    EXAMINATION:  XR FOOT COMPLETE 3 VIEW RIGHT     CLINICAL HISTORY:  Unspecified injury of right foot, initial encounter     TECHNIQUE:  XR FOOT COMPLETE 3 VIEW RIGHT     COMPARISON:  None.     FINDINGS:  No evidence of acute fracture or dislocation.  No radiopaque foreign body seen.     Impression:     As above.        Electronically signed by:Kevin Lopez  Date:                                            06/20/2025  Time:                                           15:04       ASSESSMENT / PLAN:    Patient Instructions         ASSESSMENT:    Oswaldo Fine Dwain Bowers is a 30 y.o. male with right foot and ankle pain, acute onset secondary to date of injury on 6/19/25    Encounter Diagnoses   Name Primary?    Sprain of right foot, subsequent encounter     Pain in joint involving right ankle and foot Yes    Achilles tendon pain         PLAN:  Medications  Over-the-counter NSAIDs/Tylenol as needed for pain control  Advanced Imaging:  MRI scan of the right ankle  [facility]  Continue difficulty ambulating despite walking boot from injury approximately 3 weeks ago.  X-rays negative for fracture.  Concern for underlying soft  tissue injury including possible tendon or ligament injury.  Majority of pain is localized to the distal Achilles in the lateral ankle area with some radiation into the lateral midfoot  Referral:  Possible referral to Dr. Carranza pending MRI results  DME:  Continue using walking boot  Work/School release/restrictions:  Work note given to the patient today in clinic  FMLA paperwork initially filled out by his PCP  Work note given to him today stating recommend continued out of work status until follow up after MRI scan is performed.  Return to work status and prognosis pending MRI results  Weight Bearing Status:  Minimize weight-bearing to right lower extremity when able  If no improvement by next office visit, treatment options include:  Referral to Dr. Carranza  Physical therapy  Return to Clinic:  After MRI scan of the right ankle   Return to clinic sooner if any symptoms change or worsen.  Imaging needed at next office visit:  Per physician               Cruz Quick PA-C  Sports Medicine Physician Assistant       Disclaimer:   This note was prepared using a voice recognition system and is likely to have sound alike errors within the text.   I discussed worrisome and red flag signs and symptoms with the patient. The patient expressed understanding and agreed to alert me immediately or to go to the emergency room if they experience any of these.   Treatment plan was developed with input from the patient/family, and they expressed understanding and agreement with the plan. All questions were answered today.             [1]   Social History  Socioeconomic History    Marital status: Single   Tobacco Use    Smoking status: Never    Smokeless tobacco: Never   Substance and Sexual Activity    Alcohol use: No    Drug use: Yes     Types: Marijuana    Sexual activity: Never     Social Drivers of Health     Financial Resource Strain: Medium Risk (7/11/2025)    Overall Financial Resource Strain (CARDIA)     Difficulty  of Paying Living Expenses: Somewhat hard   Food Insecurity: Food Insecurity Present (7/11/2025)    Hunger Vital Sign     Worried About Running Out of Food in the Last Year: Sometimes true     Ran Out of Food in the Last Year: Sometimes true   Transportation Needs: Unmet Transportation Needs (7/11/2025)    PRAPARE - Transportation     Lack of Transportation (Medical): Yes     Lack of Transportation (Non-Medical): No   Physical Activity: Sufficiently Active (7/11/2025)    Exercise Vital Sign     Days of Exercise per Week: 6 days     Minutes of Exercise per Session: 60 min   Stress: Stress Concern Present (7/11/2025)    [place] [employer] - Occupational Stress Questionnaire     Feeling of Stress : Rather much   Housing Stability: Low Risk  (7/11/2025)    Housing Stability Vital Sign     Unable to Pay for Housing in the Last Year: No     Number of Times Moved in the Last Year: 0     Homeless in the Last Year: No

## 2025-07-11 NOTE — PATIENT INSTRUCTIONS
ASSESSMENT:    Oswaldo Prakash Jr. is a 30 y.o. male with right foot and ankle pain, acute onset secondary to date of injury on 6/19/25    Encounter Diagnoses   Name Primary?    Sprain of right foot, subsequent encounter     Pain in joint involving right ankle and foot Yes    Achilles tendon pain         PLAN:  Medications  Over-the-counter NSAIDs/Tylenol as needed for pain control  Advanced Imaging:  MRI scan of the right ankle  [facility]  Continue difficulty ambulating despite walking boot from injury approximately 3 weeks ago.  X-rays negative for fracture.  Concern for underlying soft tissue injury including possible tendon or ligament injury.  Majority of pain is localized to the distal Achilles in the lateral ankle area with some radiation into the lateral midfoot  Referral:  Possible referral to Dr. Carranza pending MRI results  DME:  Continue using walking boot  Work/School release/restrictions:  Work note given to the patient today in clinic  FMLA paperwork initially filled out by his PCP  Work note given to him today stating recommend continued out of work status until follow up after MRI scan is performed.  Return to work status and prognosis pending MRI results  Weight Bearing Status:  Minimize weight-bearing to right lower extremity when able  If no improvement by next office visit, treatment options include:  Referral to Dr. Carranza  Physical therapy  Return to Clinic:  After MRI scan of the right ankle   Return to clinic sooner if any symptoms change or worsen.  Imaging needed at next office visit:  Per physician

## 2025-07-14 ENCOUNTER — HOSPITAL ENCOUNTER (OUTPATIENT)
Dept: RADIOLOGY | Facility: HOSPITAL | Age: 30
Discharge: HOME | End: 2025-07-14
Attending: PHYSICIAN ASSISTANT
Payer: COMMERCIAL

## 2025-07-14 ENCOUNTER — PATIENT MESSAGE (OUTPATIENT)
Dept: ORTHOPEDICS | Facility: CLINIC | Age: 30
End: 2025-07-14
Payer: COMMERCIAL

## 2025-07-14 DIAGNOSIS — M25.571 PAIN IN JOINT INVOLVING RIGHT ANKLE AND FOOT: ICD-10-CM

## 2025-07-14 DIAGNOSIS — S93.601D SPRAIN OF RIGHT FOOT, SUBSEQUENT ENCOUNTER: ICD-10-CM

## 2025-07-14 DIAGNOSIS — M76.60 ACHILLES TENDON PAIN: ICD-10-CM

## 2025-07-14 PROCEDURE — 73721 MRI JNT OF LWR EXTRE W/O DYE: CPT | Mod: 26,RT,, | Performed by: RADIOLOGY

## 2025-07-14 PROCEDURE — 73721 MRI JNT OF LWR EXTRE W/O DYE: CPT | Mod: TC,RT

## 2025-07-15 ENCOUNTER — PATIENT MESSAGE (OUTPATIENT)
Dept: ORTHOPEDICS | Facility: CLINIC | Age: 30
End: 2025-07-15
Payer: COMMERCIAL

## 2025-07-15 NOTE — PROGRESS NOTES
"            [address] [facility] [place] [address]   Phone [phone]  Fax [phone]           CHIEF COMPLAINT:   Chief Complaint   Patient presents with    Right Foot - Injury        HISTORY OF PRESENT ILLNESS (BN) (07/16/2025):    Oswaldo presents as a referral from Cruz Bailey PA-C for evaluation of a right calcaneal stress fracture that occurred on June 19th, 2025 when he stepped down hard while wearing slippers. He typically wears thick work boots and forgot that they were not on at the time. He describes the pain as intense and significant, particularly when walking, with ongoing swelling. He has been weightbearing in a short boot. He does have crutches at home that he uses for longer distances. He reports 8/10 pain today. He is not currently taking any medication for pain. Mobility is complicated by stairs at home, with his bedroom being upstairs. The day following the injury, XRs ruled out a fracture. Subsequently, an MRI on 7/14/2025 confirmed a calcaneal stress fracture. He last saw his PCP on June 20th. He has not been able to work due to this injury. He works for [email or web address] and his job requires driving and frequent walking.          PAST MEDICAL HISTORY:    Past Medical History:   Diagnosis Date    Allergy        No results found for: "HGBA1C"     PAST SURGICAL HISTORY:    Past Surgical History:   Procedure Laterality Date    OPEN REDUCTION AND INTERNAL FIXATION (ORIF) OF INJURY OF HAND Right 2/21/2020    Procedure: ORIF, HAND;  Surgeon: Rafiq Garcia MD;  Location: [facility];  Service: Orthopedics;  Laterality: Right;        MEDICATIONS:  Current Medications[1]     Medications Discontinued During This Encounter   Medication Reason    ibuprofen (ADVIL,MOTRIN) 100 mg/5 mL suspension     traMADoL (ULTRAM) 50 mg tablet     diclofenac (VOLTAREN) 50 MG EC tablet          ALLERGIES:     Review of patient's allergies indicates:   Allergen Reactions    Guaifenesin Hives and Shortness Of Breath    " Guaifenesin-sodium citrate             FAMILY HISTORY:   Family History   Problem Relation Name Age of Onset    No Known Problems Mother      No Known Problems Father      Melanoma Neg Hx      Psoriasis Neg Hx      Lupus Neg Hx      Eczema Neg Hx             SOCIAL HISTORY:    Social History     Socioeconomic History    Marital status: Single   Tobacco Use    Smoking status: Never    Smokeless tobacco: Never   Substance and Sexual Activity    Alcohol use: No    Drug use: Yes     Types: Marijuana    Sexual activity: Never     Social Drivers of Health     Financial Resource Strain: Medium Risk (7/11/2025)    Overall Financial Resource Strain (CARDIA)     Difficulty of Paying Living Expenses: Somewhat hard   Food Insecurity: Food Insecurity Present (7/11/2025)    Hunger Vital Sign     Worried About Running Out of Food in the Last Year: Sometimes true     Ran Out of Food in the Last Year: Sometimes true   Transportation Needs: Unmet Transportation Needs (7/11/2025)    PRAPARE - Transportation     Lack of Transportation (Medical): Yes     Lack of Transportation (Non-Medical): No   Physical Activity: Sufficiently Active (7/11/2025)    Exercise Vital Sign     Days of Exercise per Week: 6 days     Minutes of Exercise per Session: 60 min   Stress: Stress Concern Present (7/11/2025)    [place] citizen [place] [employer] Occupational [employer] - Occupational Stress Questionnaire     Feeling of Stress : Rather much   Housing Stability: Low Risk  (7/11/2025)    Housing Stability Vital Sign     Unable to Pay for Housing in the Last Year: No     Number of Times Moved in the Last Year: 0     Homeless in the Last Year: No         PHYSICAL EXAMINATION:     Estimated body mass index is 19.02 kg/m² as calculated from the following:    Height as of 6/20/25: 6' (1.829 m).    Weight as of this encounter: 63.6 kg (140 lb 3.4 oz).   ASSISTIVE DEVICE: Short boot    MUSCULOSKELETAL:    Ankle Exam (affected extremity):   Inspection:         Gross  deformity   (-)         Erythema   (-)        Ecchymosis   (-)          Swelling   (+) posterior hindfoot           Open wounds   (-)         Surgical scars   (-)                    Standing alignment:  Not tested   Palpation tenderness:  Bony:  Hindfoot:  Proximal fibula  (-)  Calcaneus  (+)   Distal fibula  (-)  Talus   (-)   Medial malleolus  (-)  CC joint/cuboid  (-)   Tibiotalar joint  (-)  Lateral gutter  (-)  Plantar fascia  (-)  Medial gutter  (-)   Midfoot:   TN joint   (-)   NC joints   (-)   TMT joints   (-)   Forefoot:   (-)      Soft tissue:     Tendons:    Achilles midsubstance (-)  Peroneal tendons  (-)   Achilles insertion  (-)  Posterior tibial tendon (-)   Retrocalcaneal bursa (-)  Anterior tibial tendon  (-)   Ligaments:   ATFL   (-)  Deltoid   (-)   CFL   (-)  Syndesmosis  (-)  ROM:  Affected Ankle:         DF:    10°        PF:    40°                 Pain    (-)       Crepitance   (-)       Sensory:  Normal sensation to light touch in Sa/Mena/DP/SP/T nerve distributions    Motor:               Fires EHL/FHL/Tibialis anterior/Gastrocsoleus   Vascular:  Foot WWP with brisk capillary refill    DP/PT pulses palpable  Special Tests:  Kimble   (-)  Anterior drawer   (-)  Calcaneal squeeze  (+)  Syndesmotic squeeze  (-)       IMAGING:      MRI Ankle Without Contrast Right  Narrative: EXAM:  MRI ANKLE WITHOUT CONTRAST RIGHT    CLINICAL HISTORY: Right ankle pain. Ankle pain, tendon abnormality suspected, neg xray;Ankle pain, stress fracture suspected, neg xray;Achilles tendon trauma or laceration;right achilles, ankle pain from injury on 6/19. some radiating pain to midfoot; [J15.064I]-Unspecifi TRUNCATED ...    TECHNIQUE: Standard multiplanar, multisequence MR imaging protocol of the right ankle was performed.    FINDINGS:  BONES & JOINTS: Nondisplaced stress fracture of the plantar calcaneal tuberosity with prominent surrounding marrow edema.  No other osseous abnormality identified. Joint alignment  within normal limits. No arthritic change.  No osteochondral defects.    LIGAMENTS:  Anterior inferior tibiofibular ligament: Intact  Posterior inferior tibiofibular ligament: Intact  Anterior talofibular ligament: Intact  Calcaneofibular ligament: Intact  Posterior talofibular ligament: Intact  Deltoid ligament complex: Intact  Spring ligament: Intact    TENDONS:  Peroneus longus tendon: Mild tendinosis.  Peroneus brevis tendon: Unremarkable  Posterior tibial tendon: Small partial interstitial tear of the submalleolar tendon.  Flexor digitorum longus tendon: Unremarkable  Flexor hallucis longus: Unremarkable  Achilles tendon: Unremarkable  Extensor tendons: Unremarkable    OTHER STRUCTURES:  Plantar fascia: Unremarkable  Muscles: Unremarkable  Tarsal tunnel: Unremarkable  Sinus tarsi: Unremarkable  Impression: 1.  Calcaneal stress fracture with prominent marrow edema.  2.  Small low-grade partial tear of the posterior tibial tendon.  3.  Mild peroneus longus tendinosis.    Finalized on: 7/14/2025 9:21 AM By:  Cam Watson MD  [facility]# [id number]      2025-07-14 09:23:[id number]     [facility]           ASSESSMENT: 30 y.o. male  with:   Calcaneal stress fracture of right foot sustained 6/19/2025 with MRI 7/14/2025 demonstrating:  Calcaneal stress fracture with prominent marrow edema.  Small low-grade partial tear of the posterior tibial tendon.  Mild peroneus longus tendinosis.    PLAN:  Data:   I independently visualized images including but not limited to xrays, MRI, or CT scan today.   I reviewed available old/outside records.   PT/OT:   Not necessary for this condition.   Medications:    Norco and Meloxicam prescribed today to take as needed for pain.    DME and weight bearing status:    Minimize weight bearing in boot. He has crutches at home to use for distances. Discussed a knee scooter due to discomfort with crutches but he would like to continue crutches at this time.   Advanced Imaging:   MRI results reviewed with the  patient today.    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 He is unable to work currently due to his restrictions. FMLA paper completed today. I expect that he will be able to ready to return to work with some restrictions next month.    Education:    I had a long discussion with the patient about the causes, treatments, and prognosis/natural history for their diagnosis. We discussed effective ways to improve symptoms as well as what types of activities may make the symptoms/prognosis worse. We discussed signs and symptoms and other reasons to return to clinic sooner.  Nicotine cessation: Patient educated on the importance of nicotine cessation as nicotine negatively impacts healing.     Return to clinic:    2-3 weeks with Dr. Carranza   Imaging needed at next follow-up: None               Provider Signature: Lulu Sim PA-C  Official Website  Schedule An Appointment    This note was generated with the assistance of ambient listening technology. Verbal consent was obtained by the patient and accompanying visitor(s) for the recording of patient appointment to facilitate this note. I attest to having reviewed and edited the generated note for accuracy, though some syntax or spelling errors may persist. Please contact the author of this note for any clarification.          [1]   Current Outpatient Medications:     azelastine-fluticasone 137-50 mcg/spray Spry, 1 each by Nasal route 2 (two) times daily., Disp: 23 g, Rfl: 1    fluticasone propionate (FLONASE) 50 mcg/actuation nasal spray, 1 spray (50 mcg total) by Each Nostril route 2 (two) times daily as needed. (Patient not taking: Reported on 6/20/2025), Disp: 15 g, Rfl: 0    HYDROcodone-acetaminophen (NORCO) 5-325 mg per tablet, Take 1 tablet by mouth every 12 (twelve) hours as needed for Pain., Disp: 14 tablet, Rfl: 0    meloxicam (MOBIC) 15 MG tablet, Take 1 tablet (15 mg total) by mouth daily as needed for Pain., Disp:  30 tablet, Rfl: 0    ondansetron (ZOFRAN) 4 MG tablet, Take 1 tablet (4 mg total) by mouth every 6 (six) hours as needed. (Patient not taking: Reported on 6/20/2025), Disp: 20 tablet, Rfl: 0    promethazine (PHENERGAN) 25 MG tablet, Take 1 tablet (25 mg total) by mouth every 6 (six) hours as needed for Nausea. (Patient not taking: Reported on 6/20/2025), Disp: 15 tablet, Rfl: 0    promethazine-dextromethorphan (PROMETHAZINE-DM) 6.25-15 mg/5 mL Syrp, Take 5 mLs by mouth every 6 (six) hours as needed. (Patient not taking: Reported on 6/20/2025), Disp: , Rfl:

## 2025-07-16 ENCOUNTER — OFFICE VISIT (OUTPATIENT)
Dept: ORTHOPEDICS | Facility: CLINIC | Age: 30
End: 2025-07-16
Payer: COMMERCIAL

## 2025-07-16 ENCOUNTER — PATIENT MESSAGE (OUTPATIENT)
Dept: ORTHOPEDICS | Facility: CLINIC | Age: 30
End: 2025-07-16

## 2025-07-16 VITALS — BODY MASS INDEX: 19.02 KG/M2 | WEIGHT: 140.19 LBS

## 2025-07-16 DIAGNOSIS — M84.374A STRESS FRACTURE OF RIGHT CALCANEUS, INITIAL ENCOUNTER: Primary | ICD-10-CM

## 2025-07-16 PROCEDURE — 99999 PR PBB SHADOW E&M-EST. PATIENT-LVL III: CPT | Mod: PBBFAC,,,

## 2025-07-16 RX ORDER — HYDROCODONE BITARTRATE AND ACETAMINOPHEN 5; 325 MG/1; MG/1
1 TABLET ORAL EVERY 12 HOURS PRN
Qty: 14 TABLET | Refills: 0 | Status: SHIPPED | OUTPATIENT
Start: 2025-07-16

## 2025-07-16 RX ORDER — MELOXICAM 15 MG/1
15 TABLET ORAL DAILY PRN
Qty: 30 TABLET | Refills: 0 | Status: SHIPPED | OUTPATIENT
Start: 2025-07-16

## 2025-07-16 NOTE — Clinical Note
July 16, 2025      [facility]  [address]  [place] [address]  Phone: [phone]  Fax: [phone]       Patient: Oswaldo Prakash   YOB: 1995  Date of Visit: 07/16/2025    To Whom It May Concern:    Lexi Prakash  was at [facility] on 07/16/2025. The patient may return to work/school on *** {With/no:28208} restrictions. If you have any questions or concerns, or if I can be of further assistance, please do not hesitate to contact me.    Sincerely,    Jaquan Busby MA

## 2025-07-16 NOTE — LETTER
July 16, 2025      The [facility]  [address] [facility]  [place] [address]  Phone: [phone]  Fax: [phone]       Patient: Oswaldo Prakash   YOB: 1995  Date of Visit: 07/16/2025    To Whom It May Concern:    Lexi Prakash  was at [facility] on 07/16/2025. The patient may not return to work until cleared by physician. Patient will be re-evaluated in 3 weeks. Further directions will be provided at that appointment. If you have any questions or concerns, or if I can be of further assistance, please do not hesitate to contact me.    Sincerely,

## 2025-07-25 NOTE — PROGRESS NOTES
"            [address] [facility] [place] [address]   Phone [phone]  Fax [phone]           CHIEF COMPLAINT:   Chief Complaint   Patient presents with    Right Foot - Pain     Pt to clinic for Right foot stress fracture follow-up  Pain:7/10     HISTORY OF PRESENT ILLNESS BN (07/31/2025):  Oswaldo presents for follow-up of a right foot calcaneus fracture occurred approximately 6 weeks ago. He reports some improvement but states he still cannot put on a shoe or walk properly. He has been minimally weightbearing in a short boot. He reports 7/10 pain today. He is taking ibuprofen as needed for pain. He has not yet started taking a vitamin D supplement. He is currently not able to work due to his condition.     HISTORY OF PRESENT ILLNESS (BN) (07/16/2025):    Oswaldo presents as a referral from Cruz Bailey PA-C for evaluation of a right calcaneal stress fracture that occurred on June 19th, 2025 when he stepped down hard while wearing slippers. He typically wears thick work boots and forgot that they were not on at the time. He describes the pain as intense and significant, particularly when walking, with ongoing swelling. He has been weightbearing in a short boot. He does have crutches at home that he uses for longer distances. He reports 8/10 pain today. He is not currently taking any medication for pain. Mobility is complicated by stairs at home, with his bedroom being upstairs. The day following the injury, XRs ruled out a fracture. Subsequently, an MRI on 7/14/2025 confirmed a calcaneal stress fracture. He last saw his PCP on June 20th. He has not been able to work due to this injury. He works for [employer] and his job requires driving and frequent walking.          PAST MEDICAL HISTORY:    Past Medical History:   Diagnosis Date    Allergy        No results found for: "HGBA1C"     PAST SURGICAL HISTORY:    Past Surgical History:   Procedure Laterality Date    OPEN REDUCTION AND INTERNAL FIXATION " (ORIF) OF INJURY OF HAND Right 2/21/2020    Procedure: ORIF, HAND;  Surgeon: Rafiq Garcia MD;  Location: [facility];  Service: Orthopedics;  Laterality: Right;        MEDICATIONS:  Current Medications[1]     There are no discontinued medications.        ALLERGIES:     Review of patient's allergies indicates:   Allergen Reactions    Guaifenesin Hives and Shortness Of Breath    Guaifenesin-sodium citrate             FAMILY HISTORY:   Family History   Problem Relation Name Age of Onset    No Known Problems Mother      No Known Problems Father      Melanoma Neg Hx      Psoriasis Neg Hx      Lupus Neg Hx      Eczema Neg Hx             SOCIAL HISTORY:    Social History     Socioeconomic History    Marital status: Single   Tobacco Use    Smoking status: Never    Smokeless tobacco: Never   Substance and Sexual Activity    Alcohol use: No    Drug use: Yes     Types: Marijuana    Sexual activity: Never     Social Drivers of Health     Financial Resource Strain: Medium Risk (7/11/2025)    Overall Financial Resource Strain (CARDIA)     Difficulty of Paying Living Expenses: Somewhat hard   Food Insecurity: Food Insecurity Present (7/11/2025)    Hunger Vital Sign     Worried About Running Out of Food in the Last Year: Sometimes true     Ran Out of Food in the Last Year: Sometimes true   Transportation Needs: Unmet Transportation Needs (7/11/2025)    PRAPARE - Transportation     Lack of Transportation (Medical): Yes     Lack of Transportation (Non-Medical): No   Physical Activity: Sufficiently Active (7/11/2025)    Exercise Vital Sign     Days of Exercise per Week: 6 days     Minutes of Exercise per Session: 60 min   Stress: Stress Concern Present (7/11/2025)    [employer] [place] [employer] - Occupational Stress Questionnaire     Feeling of Stress : Rather much   Housing Stability: Low Risk  (7/11/2025)    Housing Stability Vital Sign     Unable to Pay for Housing in the Last Year: No     Number of Times Moved in the  Last Year: 0     Homeless in the Last Year: No         PHYSICAL EXAMINATION:     Estimated body mass index is 19.12 kg/m² as calculated from the following:    Height as of this encounter: 6' (1.829 m).    Weight as of this encounter: 64 kg (141 lb).   ASSISTIVE DEVICE: Short boot    MUSCULOSKELETAL:    Ankle Exam (affected extremity):   Inspection:         Gross deformity   (-)         Erythema   (-)        Ecchymosis   (-)          Swelling   (+) mild, posterior hindfoot           Open wounds   (-)         Surgical scars   (-)                    Standing alignment:  Not tested   Palpation tenderness:  Bony:  Hindfoot:  Proximal fibula  (-)  Calcaneus  (+)   Distal fibula  (-)  Talus   (-)   Medial malleolus  (-)  CC joint/cuboid  (-)   Tibiotalar joint  (-)  Lateral gutter  (-)  Plantar fascia  (-)  Medial gutter  (-)   Midfoot:   TN joint   (-)   NC joints   (-)   TMT joints   (-)   Forefoot:   (-)      Soft tissue:     Tendons:    Achilles midsubstance (-)  Peroneal tendons  (-)   Achilles insertion  (-)  Posterior tibial tendon (-)   Retrocalcaneal bursa (-)  Anterior tibial tendon  (-)   Ligaments:   ATFL   (-)  Deltoid   (-)   CFL   (-)  Syndesmosis  (-)  ROM:  Affected Ankle:         DF:    10°        PF:    40°                 Pain    (-)       Crepitance   (-)       Sensory:  Normal sensation to light touch in Sa/Mena/DP/SP/T nerve distributions    Motor:               Fires EHL/FHL/Tibialis anterior/Gastrocsoleus   Vascular:  Foot WWP with brisk capillary refill    DP/PT pulses palpable  Special Tests:  Kimble   (-)  Anterior drawer   (-)  Calcaneal squeeze  (+)  Syndesmotic squeeze  (-)       IMAGING:      MRI Ankle Without Contrast Right  Narrative: EXAM:  MRI ANKLE WITHOUT CONTRAST RIGHT    CLINICAL HISTORY: Right ankle pain. Ankle pain, tendon abnormality suspected, neg xray;Ankle pain, stress fracture suspected, neg xray;Achilles tendon trauma or laceration;right achilles, ankle pain from injury on  6/19. some radiating pain to midfoot; [M62.706Y]-Unspecifi TRUNCATED ...    TECHNIQUE: Standard multiplanar, multisequence MR imaging protocol of the right ankle was performed.    FINDINGS:  BONES & JOINTS: Nondisplaced stress fracture of the plantar calcaneal tuberosity with prominent surrounding marrow edema.  No other osseous abnormality identified. Joint alignment within normal limits. No arthritic change.  No osteochondral defects.    LIGAMENTS:  Anterior inferior tibiofibular ligament: Intact  Posterior inferior tibiofibular ligament: Intact  Anterior talofibular ligament: Intact  Calcaneofibular ligament: Intact  Posterior talofibular ligament: Intact  Deltoid ligament complex: Intact  Spring ligament: Intact    TENDONS:  Peroneus longus tendon: Mild tendinosis.  Peroneus brevis tendon: Unremarkable  Posterior tibial tendon: Small partial interstitial tear of the submalleolar tendon.  Flexor digitorum longus tendon: Unremarkable  Flexor hallucis longus: Unremarkable  Achilles tendon: Unremarkable  Extensor tendons: Unremarkable    OTHER STRUCTURES:  Plantar fascia: Unremarkable  Muscles: Unremarkable  Tarsal tunnel: Unremarkable  Sinus tarsi: Unremarkable  Impression: 1.  Calcaneal stress fracture with prominent marrow edema.  2.  Small low-grade partial tear of the posterior tibial tendon.  3.  Mild peroneus longus tendinosis.    Finalized on: 7/14/2025 9:21 AM By:  Cam Watson MD  [facility]# [id number]      2025-07-14 09:23:[id number]     [facility]           ASSESSMENT: 30 y.o. male  with:   Calcaneal fracture of right foot sustained 6/19/2025 with MRI 7/14/2025 demonstrating:  Calcaneal stress fracture with prominent marrow edema.  Small low-grade partial tear of the posterior tibial tendon.  Mild peroneus longus tendinosis.    PLAN:  Data:   I independently visualized images including but not limited to xrays, MRI, or CT scan today.   I reviewed available old/outside records.   PT/OT:   Not necessary for this  condition.   Medications:    Continue Ibuprofen as needed for pain. Begin taking vitamin D supplement.     DME and weight bearing status:    Weightbearing as tolerated. He may now begin to wean out of the boot as tolerated. Recommend supportive shoes with heel cup. He was provided a visco heel cup in clinic today. Counseled patient that he should not push through pain as he progresses his weightbearing status.     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 He is unable to work currently due to his restrictions. He is not able to return to work until released by this office.   Education:    I had a long discussion with the patient about the causes, treatments, and prognosis/natural history for their diagnosis. We discussed effective ways to improve symptoms as well as what types of activities may make the symptoms/prognosis worse. We discussed signs and symptoms and other reasons to return to clinic sooner.  Nicotine cessation: Patient educated on the importance of nicotine cessation as nicotine negatively impacts healing.     Return to clinic:    4 weeks with Dr. Carranza   Imaging needed at next follow-up: None               Official Website  Schedule An Appointment    This note was generated with the assistance of ambient listening technology. Verbal consent was obtained by the patient and accompanying visitor(s) for the recording of patient appointment to facilitate this note. I attest to having reviewed and edited the generated note for accuracy, though some syntax or spelling errors may persist. Please contact the author of this note for any clarification.         [1]   Current Outpatient Medications:     azelastine-fluticasone 137-50 mcg/spray Spry, 1 each by Nasal route 2 (two) times daily., Disp: 23 g, Rfl: 1    fluticasone propionate (FLONASE) 50 mcg/actuation nasal spray, 1 spray (50 mcg total) by Each Nostril route 2 (two) times daily as needed. (Patient not taking:  Reported on 7/31/2025), Disp: 15 g, Rfl: 0    HYDROcodone-acetaminophen (NORCO) 5-325 mg per tablet, Take 1 tablet by mouth every 12 (twelve) hours as needed for Pain. (Patient not taking: Reported on 7/31/2025), Disp: 14 tablet, Rfl: 0    meloxicam (MOBIC) 15 MG tablet, Take 1 tablet (15 mg total) by mouth daily as needed for Pain. (Patient not taking: Reported on 7/31/2025), Disp: 30 tablet, Rfl: 0    ondansetron (ZOFRAN) 4 MG tablet, Take 1 tablet (4 mg total) by mouth every 6 (six) hours as needed. (Patient not taking: Reported on 7/31/2025), Disp: 20 tablet, Rfl: 0    promethazine (PHENERGAN) 25 MG tablet, Take 1 tablet (25 mg total) by mouth every 6 (six) hours as needed for Nausea. (Patient not taking: Reported on 7/31/2025), Disp: 15 tablet, Rfl: 0    promethazine-dextromethorphan (PROMETHAZINE-DM) 6.25-15 mg/5 mL Syrp, Take 5 mLs by mouth every 6 (six) hours as needed. (Patient not taking: Reported on 7/31/2025), Disp: , Rfl:

## 2025-07-31 ENCOUNTER — HOSPITAL ENCOUNTER (OUTPATIENT)
Dept: RADIOLOGY | Facility: HOSPITAL | Age: 30
Discharge: HOME | End: 2025-07-31
Payer: COMMERCIAL

## 2025-07-31 ENCOUNTER — OFFICE VISIT (OUTPATIENT)
Dept: ORTHOPEDICS | Facility: CLINIC | Age: 30
End: 2025-07-31
Payer: COMMERCIAL

## 2025-07-31 VITALS — HEIGHT: 72 IN | WEIGHT: 141 LBS | BODY MASS INDEX: 19.1 KG/M2

## 2025-07-31 DIAGNOSIS — M84.374A STRESS FRACTURE OF RIGHT CALCANEUS, INITIAL ENCOUNTER: ICD-10-CM

## 2025-07-31 DIAGNOSIS — S92.001D CLOSED NONDISPLACED FRACTURE OF RIGHT CALCANEUS WITH ROUTINE HEALING, UNSPECIFIED PORTION OF CALCANEUS, SUBSEQUENT ENCOUNTER: Primary | ICD-10-CM

## 2025-07-31 DIAGNOSIS — M84.374A STRESS FRACTURE OF RIGHT CALCANEUS, INITIAL ENCOUNTER: Primary | ICD-10-CM

## 2025-07-31 PROCEDURE — 99214 OFFICE O/P EST MOD 30 MIN: CPT | Mod: S$GLB,,,

## 2025-07-31 PROCEDURE — 99999 PR PBB SHADOW E&M-EST. PATIENT-LVL III: CPT | Mod: PBBFAC,,,

## 2025-07-31 PROCEDURE — 1159F MED LIST DOCD IN RCRD: CPT | Mod: CPTII,S$GLB,,

## 2025-07-31 PROCEDURE — 73630 X-RAY EXAM OF FOOT: CPT | Mod: TC,RT

## 2025-07-31 PROCEDURE — 73630 X-RAY EXAM OF FOOT: CPT | Mod: 26,RT,, | Performed by: RADIOLOGY

## 2025-07-31 PROCEDURE — 3008F BODY MASS INDEX DOCD: CPT | Mod: CPTII,S$GLB,,

## 2025-07-31 NOTE — LETTER
July 31, 2025      The [facility]  [address] [facility]  [place] [address]  Phone: [phone]  Fax: [phone]       Patient: Oswaldo Prakash   YOB: 1995  Date of Visit: 07/31/2025    To Whom It May Concern:    Lexi Prakash  was at [facility] on 07/31/2025. The patient may not return to work until cleared by physician. Patient will be re-evaluated in 4 weeks. Further directions will be provided at that appointment.  If you have any questions or concerns, or if I can be of further assistance, please do not hesitate to contact me.          Sincerely,         Lulu Sim PA-C

## 2025-09-02 ENCOUNTER — OFFICE VISIT (OUTPATIENT)
Dept: ORTHOPEDICS | Facility: CLINIC | Age: 30
End: 2025-09-02
Payer: COMMERCIAL

## 2025-09-02 VITALS — WEIGHT: 141.13 LBS | BODY MASS INDEX: 19.14 KG/M2

## 2025-09-02 DIAGNOSIS — M84.374D: Primary | ICD-10-CM

## 2025-09-02 PROCEDURE — 99999 PR PBB SHADOW E&M-EST. PATIENT-LVL III: CPT | Mod: PBBFAC,,, | Performed by: ORTHOPAEDIC SURGERY

## 2025-09-02 PROCEDURE — 99214 OFFICE O/P EST MOD 30 MIN: CPT | Mod: S$GLB,,, | Performed by: ORTHOPAEDIC SURGERY

## 2025-09-02 PROCEDURE — 1159F MED LIST DOCD IN RCRD: CPT | Mod: CPTII,S$GLB,, | Performed by: ORTHOPAEDIC SURGERY

## 2025-09-02 PROCEDURE — 3008F BODY MASS INDEX DOCD: CPT | Mod: CPTII,S$GLB,, | Performed by: ORTHOPAEDIC SURGERY

## 2025-09-02 RX ORDER — VIT C/E/ZN/COPPR/LUTEIN/ZEAXAN 250MG-90MG
1000 CAPSULE ORAL DAILY
Qty: 30 CAPSULE | Refills: 1 | Status: SHIPPED | OUTPATIENT
Start: 2025-09-02

## (undated) DEVICE — Device

## (undated) DEVICE — SYR 10CC LUER LOCK

## (undated) DEVICE — DRAPE MOBILE C-ARM

## (undated) DEVICE — SEE MEDLINE ITEM 157131

## (undated) DEVICE — PAD CAST SPECIALIST STRL 4

## (undated) DEVICE — SPLINT CAST ROLL 2IN X 15 FT

## (undated) DEVICE — BIT DRILL 1.5MM D 65MM/50MM

## (undated) DEVICE — DRESSING XEROFORM FOIL PK 1X8

## (undated) DEVICE — STOCKINET 4INX48

## (undated) DEVICE — SEE MEDLINE ITEM 152522

## (undated) DEVICE — INSTRUMENT FRAZIER 10FR W/VENT

## (undated) DEVICE — SOL NACL IRR 1000ML BTL

## (undated) DEVICE — SEE MEDLINE ITEM 157117

## (undated) DEVICE — SEE MEDLINE ITEM 157027

## (undated) DEVICE — GLOVE SURG BIOGEL LATEX SZ 7.5

## (undated) DEVICE — UNDERGLOVES BIOGEL PI SIZE 8

## (undated) DEVICE — TOURNIQUET SB QC DP 18X4IN

## (undated) DEVICE — COVER OVERHEAD SURG LT BLUE

## (undated) DEVICE — ELECTRODE REM PLYHSV RETURN 9

## (undated) DEVICE — SEE MEDLINE ITEM 152622

## (undated) DEVICE — SUT BONE WAX 2.5 GRMS 12/BX

## (undated) DEVICE — SEE MEDLINE ITEM 146298

## (undated) DEVICE — GAUZE SPONGE 4X4 12PLY

## (undated) DEVICE — MANIFOLD 4 PORT

## (undated) DEVICE — STAPLER SKIN PROXIMATE WIDE

## (undated) DEVICE — SCREW CORTEX 2MM 12MML
Type: IMPLANTABLE DEVICE | Site: HAND | Status: NON-FUNCTIONAL
Removed: 2020-02-21

## (undated) DEVICE — BIT DRILL 2.0MM D 65MM/50MM.

## (undated) DEVICE — NDL SAFETY 25G X 1.5 ECLIPSE

## (undated) DEVICE — SEE MEDLINE ITEM 146308

## (undated) DEVICE — ALCOHOL 70% ISOP RUBBING 4OZ